# Patient Record
Sex: FEMALE | Race: WHITE | NOT HISPANIC OR LATINO | Employment: OTHER | URBAN - METROPOLITAN AREA
[De-identification: names, ages, dates, MRNs, and addresses within clinical notes are randomized per-mention and may not be internally consistent; named-entity substitution may affect disease eponyms.]

---

## 2018-10-26 ENCOUNTER — HOSPITAL ENCOUNTER (OUTPATIENT)
Facility: HOSPITAL | Age: 68
Discharge: HOME | End: 2018-10-28
Attending: EMERGENCY MEDICINE | Admitting: HOSPITALIST
Payer: COMMERCIAL

## 2018-10-26 DIAGNOSIS — R07.9 CHEST PAIN: ICD-10-CM

## 2018-10-26 DIAGNOSIS — I20.89 ANGINA AT REST: Primary | ICD-10-CM

## 2018-10-26 PROBLEM — E87.6 HYPOKALEMIA: Status: ACTIVE | Noted: 2018-10-26

## 2018-10-26 PROBLEM — I15.2 HYPERTENSION ASSOCIATED WITH DIABETES: Status: ACTIVE | Noted: 2018-10-26

## 2018-10-26 PROBLEM — E11.9 CONTROLLED TYPE 2 DIABETES MELLITUS WITHOUT COMPLICATION, WITHOUT LONG-TERM CURRENT USE OF INSULIN: Status: ACTIVE | Noted: 2018-10-26

## 2018-10-26 PROBLEM — E11.59 HYPERTENSION ASSOCIATED WITH DIABETES: Status: ACTIVE | Noted: 2018-10-26

## 2018-10-26 LAB
ALBUMIN SERPL BCP-MCNC: 3.9 G/DL
ALP SERPL-CCNC: 63 U/L
ALT SERPL W/O P-5'-P-CCNC: 19 U/L
ANION GAP SERPL CALC-SCNC: 7 MMOL/L
AST SERPL-CCNC: 22 U/L
BASOPHILS # BLD AUTO: 0 K/UL
BASOPHILS NFR BLD: 0.6 %
BILIRUB SERPL-MCNC: 0.3 MG/DL
BNP SERPL-MCNC: <10 PG/ML
BUN SERPL-MCNC: 14 MG/DL
CALCIUM SERPL-MCNC: 9.4 MG/DL
CHLORIDE SERPL-SCNC: 99 MMOL/L
CO2 SERPL-SCNC: 30 MMOL/L
CREAT SERPL-MCNC: 0.7 MG/DL
D DIMER PPP IA.FEU-MCNC: 10 MG/L FEU
DIFFERENTIAL METHOD: ABNORMAL
EOSINOPHIL # BLD AUTO: 0.3 K/UL
EOSINOPHIL NFR BLD: 4.7 %
ERYTHROCYTE [DISTWIDTH] IN BLOOD BY AUTOMATED COUNT: 13.7 %
EST. GFR  (AFRICAN AMERICAN): >60 ML/MIN/1.73 M^2
EST. GFR  (NON AFRICAN AMERICAN): >60 ML/MIN/1.73 M^2
GLUCOSE SERPL-MCNC: 134 MG/DL
HCT VFR BLD AUTO: 40.7 %
HGB BLD-MCNC: 13.7 G/DL
LYMPHOCYTES # BLD AUTO: 2.3 K/UL
LYMPHOCYTES NFR BLD: 40.3 %
MCH RBC QN AUTO: 29.7 PG
MCHC RBC AUTO-ENTMCNC: 33.7 G/DL
MCV RBC AUTO: 88 FL
MONOCYTES # BLD AUTO: 0.4 K/UL
MONOCYTES NFR BLD: 7.2 %
NEUTROPHILS # BLD AUTO: 2.7 K/UL
NEUTROPHILS NFR BLD: 47.2 %
PLATELET # BLD AUTO: 224 K/UL
PMV BLD AUTO: 8.3 FL
POCT GLUCOSE: 177 MG/DL (ref 70–110)
POTASSIUM SERPL-SCNC: 3.4 MMOL/L
PROT SERPL-MCNC: 8.2 G/DL
RBC # BLD AUTO: 4.61 M/UL
SODIUM SERPL-SCNC: 136 MMOL/L
TROPONIN I SERPL DL<=0.01 NG/ML-MCNC: <0.006 NG/ML
WBC # BLD AUTO: 5.8 K/UL

## 2018-10-26 PROCEDURE — G0378 HOSPITAL OBSERVATION PER HR: HCPCS

## 2018-10-26 PROCEDURE — 94760 N-INVAS EAR/PLS OXIMETRY 1: CPT

## 2018-10-26 PROCEDURE — 93005 ELECTROCARDIOGRAM TRACING: CPT

## 2018-10-26 PROCEDURE — 25000003 PHARM REV CODE 250: Performed by: EMERGENCY MEDICINE

## 2018-10-26 PROCEDURE — 25000003 PHARM REV CODE 250: Performed by: NURSE PRACTITIONER

## 2018-10-26 PROCEDURE — 85025 COMPLETE CBC W/AUTO DIFF WBC: CPT

## 2018-10-26 PROCEDURE — 83880 ASSAY OF NATRIURETIC PEPTIDE: CPT

## 2018-10-26 PROCEDURE — 85379 FIBRIN DEGRADATION QUANT: CPT

## 2018-10-26 PROCEDURE — 84484 ASSAY OF TROPONIN QUANT: CPT | Mod: 91

## 2018-10-26 PROCEDURE — 63600175 PHARM REV CODE 636 W HCPCS: Performed by: EMERGENCY MEDICINE

## 2018-10-26 PROCEDURE — 93010 ELECTROCARDIOGRAM REPORT: CPT | Mod: ,,, | Performed by: INTERNAL MEDICINE

## 2018-10-26 PROCEDURE — 36415 COLL VENOUS BLD VENIPUNCTURE: CPT

## 2018-10-26 PROCEDURE — 25500020 PHARM REV CODE 255

## 2018-10-26 PROCEDURE — 80053 COMPREHEN METABOLIC PANEL: CPT

## 2018-10-26 PROCEDURE — 99285 EMERGENCY DEPT VISIT HI MDM: CPT | Mod: 25

## 2018-10-26 RX ORDER — SODIUM CHLORIDE 9 MG/ML
INJECTION, SOLUTION INTRAVENOUS
Status: DISPENSED
Start: 2018-10-26 | End: 2018-10-27

## 2018-10-26 RX ORDER — ONDANSETRON 2 MG/ML
4 INJECTION INTRAMUSCULAR; INTRAVENOUS EVERY 8 HOURS PRN
Status: DISCONTINUED | OUTPATIENT
Start: 2018-10-26 | End: 2018-10-28 | Stop reason: HOSPADM

## 2018-10-26 RX ORDER — IBUPROFEN 200 MG
24 TABLET ORAL
Status: DISCONTINUED | OUTPATIENT
Start: 2018-10-26 | End: 2018-10-28 | Stop reason: HOSPADM

## 2018-10-26 RX ORDER — POTASSIUM CHLORIDE 750 MG/1
40 TABLET, EXTENDED RELEASE ORAL
Status: DISCONTINUED | OUTPATIENT
Start: 2018-10-26 | End: 2018-10-28 | Stop reason: HOSPADM

## 2018-10-26 RX ORDER — LEVOCETIRIZINE DIHYDROCHLORIDE 5 MG/1
5 TABLET, FILM COATED ORAL NIGHTLY
COMMUNITY

## 2018-10-26 RX ORDER — ZOLPIDEM TARTRATE 5 MG/1
5 TABLET ORAL NIGHTLY PRN
Status: DISCONTINUED | OUTPATIENT
Start: 2018-10-26 | End: 2018-10-28 | Stop reason: HOSPADM

## 2018-10-26 RX ORDER — HYDROXYZINE HYDROCHLORIDE 25 MG/1
25 TABLET, FILM COATED ORAL 3 TIMES DAILY PRN
Status: DISCONTINUED | OUTPATIENT
Start: 2018-10-26 | End: 2018-10-28 | Stop reason: HOSPADM

## 2018-10-26 RX ORDER — IBUPROFEN 200 MG
16 TABLET ORAL
Status: DISCONTINUED | OUTPATIENT
Start: 2018-10-26 | End: 2018-10-28 | Stop reason: HOSPADM

## 2018-10-26 RX ORDER — GLIMEPIRIDE 2 MG/1
4 TABLET ORAL
Status: DISCONTINUED | OUTPATIENT
Start: 2018-10-27 | End: 2018-10-27

## 2018-10-26 RX ORDER — POTASSIUM CHLORIDE 750 MG/1
60 TABLET, EXTENDED RELEASE ORAL
Status: DISCONTINUED | OUTPATIENT
Start: 2018-10-26 | End: 2018-10-28 | Stop reason: HOSPADM

## 2018-10-26 RX ORDER — GLUCAGON 1 MG
1 KIT INJECTION
Status: DISCONTINUED | OUTPATIENT
Start: 2018-10-26 | End: 2018-10-28 | Stop reason: HOSPADM

## 2018-10-26 RX ORDER — ACETAMINOPHEN 325 MG/1
650 TABLET ORAL EVERY 6 HOURS PRN
Status: DISCONTINUED | OUTPATIENT
Start: 2018-10-26 | End: 2018-10-28 | Stop reason: HOSPADM

## 2018-10-26 RX ORDER — BENAZEPRIL HYDROCHLORIDE 10 MG/1
10 TABLET ORAL DAILY
Status: DISCONTINUED | OUTPATIENT
Start: 2018-10-27 | End: 2018-10-28 | Stop reason: HOSPADM

## 2018-10-26 RX ORDER — INSULIN ASPART 100 [IU]/ML
1-10 INJECTION, SOLUTION INTRAVENOUS; SUBCUTANEOUS
Status: DISCONTINUED | OUTPATIENT
Start: 2018-10-26 | End: 2018-10-28 | Stop reason: HOSPADM

## 2018-10-26 RX ORDER — AMOXICILLIN 250 MG
1 CAPSULE ORAL DAILY PRN
Status: DISCONTINUED | OUTPATIENT
Start: 2018-10-26 | End: 2018-10-28 | Stop reason: HOSPADM

## 2018-10-26 RX ORDER — ASPIRIN 325 MG
325 TABLET ORAL
Status: COMPLETED | OUTPATIENT
Start: 2018-10-26 | End: 2018-10-26

## 2018-10-26 RX ADMIN — ASPIRIN 325 MG ORAL TABLET 325 MG: 325 PILL ORAL at 04:10

## 2018-10-26 RX ADMIN — INSULIN ASPART 1 UNITS: 100 INJECTION, SOLUTION INTRAVENOUS; SUBCUTANEOUS at 09:10

## 2018-10-26 RX ADMIN — POTASSIUM CHLORIDE 40 MEQ: 750 TABLET, EXTENDED RELEASE ORAL at 09:10

## 2018-10-26 RX ADMIN — POTASSIUM CHLORIDE 40 MEQ: 750 TABLET, EXTENDED RELEASE ORAL at 11:10

## 2018-10-26 RX ADMIN — IOHEXOL 75 ML: 350 INJECTION, SOLUTION INTRAVENOUS at 05:10

## 2018-10-26 RX ADMIN — ZOLPIDEM TARTRATE 5 MG: 5 TABLET ORAL at 11:10

## 2018-10-26 NOTE — ED NOTES
Assumed care from Yovani Knowles RN. Patient awake, alert and oriented x 3, skin warm and dry, in NAD with family at bedside.

## 2018-10-26 NOTE — ED PROVIDER NOTES
Encounter Date: 10/26/2018    SCRIBE #1 NOTE: I, Addy Saeed, am scribing for, and in the presence of, Dr. Briggs .       History     Chief Complaint   Patient presents with    Shortness of Breath     with occas CP off and on x 1 week.      Time seen by provider: 3:30 PM on 10/26/2018      Martina Osullivan is a 68 y.o. female with a PMHx of HTN and DM who presents to the ED for further evaluation SOB that started 1 week ago.  services were offered, but the patient denied. She states that she has had a cough with this SOB. She states that the SOB is abrupt and sporadic throughout the week. She states that she has had chest heaviness with this SOB as well. The patient denies drinking and smoking. She also denies fever and chills currently. The patient has no pertinent surgical history.       The history is provided by the patient.     Review of patient's allergies indicates:  No Known Allergies  Past Medical History:   Diagnosis Date    Diabetes mellitus     Hypertension      History reviewed. No pertinent surgical history.  History reviewed. No pertinent family history.  Social History     Tobacco Use    Smoking status: Never Smoker   Substance Use Topics    Alcohol use: No    Drug use: Not on file     Review of Systems   Constitutional: Negative for chills and fever.   Respiratory: Positive for shortness of breath.    Cardiovascular: Positive for chest pain.   All other systems reviewed and are negative.      Physical Exam     Initial Vitals [10/26/18 1518]   BP Pulse Resp Temp SpO2   (!) 141/66 99 14 98.9 °F (37.2 °C) 95 %      MAP       --         Physical Exam    Nursing note and vitals reviewed.  Constitutional: She appears well-developed and well-nourished. She is not diaphoretic. No distress.   HENT:   Head: Normocephalic and atraumatic.   Eyes: EOM are normal.   Neck: Normal range of motion. Neck supple.   Cardiovascular: Normal rate, regular rhythm and normal heart sounds. Exam reveals no  gallop and no friction rub.    No murmur heard.  Pulmonary/Chest: Breath sounds normal. No respiratory distress. She has no wheezes. She has no rhonchi. She has no rales.   Abdominal: She exhibits no distension. There is no tenderness. There is no rebound.   Musculoskeletal: Normal range of motion.   Neurological: She is alert and oriented to person, place, and time.   Skin: Skin is warm and dry.   Psychiatric: She has a normal mood and affect. Her behavior is normal. Judgment and thought content normal.         ED Course   Procedures  Labs Reviewed - No data to display       Imaging Results    None          Medical Decision Making:   History:   Old Medical Records: I decided to obtain old medical records.  Clinical Tests:   Lab Tests: Ordered and Reviewed  Radiological Study: Ordered and Reviewed            Scribe Attestation:   Scribe #1: I performed the above scribed service and the documentation accurately describes the services I performed. I attest to the accuracy of the note.    I, Dr. Briggs, personally performed the services described in this documentation. All medical record entries made by the scribe were at my direction and in my presence.  I have reviewed the chart and agree that the record reflects my personal performance and is accurate and complete.7:19 PM 10/26/2018            ED Course as of Oct 26 1919   Fri Oct 26, 2018   1528 BP: (!) 141/66 [EF]   1528 Temp: 98.9 °F (37.2 °C) [EF]   1528 Temp src: Oral [EF]   1528 Pulse: 99 [EF]   1528 Resp: 14 [EF]   1528 SpO2: 95 % [EF]   1603 X-Ray Chest PA And Lateral [EF]   1620 X-Ray Chest PA And Lateral [EF]   1714 D-Dimer: (!) 10.00 [EF]   1721 Troponin I: <0.006 [EF]   1818 CTA Chest Non-Coronary (PE Study) [EF]   1907 Zoe kirkland to admit  [EF]      ED Course User Index  [EF] Kyaw Briggs MD     Clinical Impression:   The encounter diagnosis was Chest pain.            Very pleasant 68-year-old female with diabetes and hypertension presents with  a week of intermittent chest discomfort and shortness of breath. She indicates the sternal region as the location of her chest pressure states that she has associated shortness of breath with this.  No diaphoresis no ripping or tearing pain.  D-dimer significantly elevated but a CTA does not demonstrate a pulmonary embolism.  EKG unremarkable for any acute findings.  Patient placed in the observation unit for serial enzymes.  Admitted to Hospital Medicine                 Kyaw Briggs MD  10/26/18 1920

## 2018-10-27 PROBLEM — F41.9 ANXIETY: Status: ACTIVE | Noted: 2018-10-27

## 2018-10-27 PROBLEM — I20.89 ANGINA AT REST: Status: ACTIVE | Noted: 2018-10-26

## 2018-10-27 LAB
CHOLEST SERPL-MCNC: 179 MG/DL
CHOLEST/HDLC SERPL: 4.3 {RATIO}
HDLC SERPL-MCNC: 42 MG/DL
HDLC SERPL: 23.5 %
LDLC SERPL CALC-MCNC: 95.8 MG/DL
NONHDLC SERPL-MCNC: 137 MG/DL
POCT GLUCOSE: 112 MG/DL (ref 70–110)
POCT GLUCOSE: 157 MG/DL (ref 70–110)
POCT GLUCOSE: 174 MG/DL (ref 70–110)
POCT GLUCOSE: 174 MG/DL (ref 70–110)
POTASSIUM SERPL-SCNC: 4.5 MMOL/L
TRIGL SERPL-MCNC: 206 MG/DL
TROPONIN I SERPL DL<=0.01 NG/ML-MCNC: <0.006 NG/ML

## 2018-10-27 PROCEDURE — 25000003 PHARM REV CODE 250: Performed by: NURSE PRACTITIONER

## 2018-10-27 PROCEDURE — 84484 ASSAY OF TROPONIN QUANT: CPT

## 2018-10-27 PROCEDURE — G0378 HOSPITAL OBSERVATION PER HR: HCPCS

## 2018-10-27 PROCEDURE — 25000003 PHARM REV CODE 250: Performed by: EMERGENCY MEDICINE

## 2018-10-27 PROCEDURE — 96372 THER/PROPH/DIAG INJ SC/IM: CPT

## 2018-10-27 PROCEDURE — 80061 LIPID PANEL: CPT

## 2018-10-27 PROCEDURE — 84132 ASSAY OF SERUM POTASSIUM: CPT

## 2018-10-27 PROCEDURE — 94761 N-INVAS EAR/PLS OXIMETRY MLT: CPT

## 2018-10-27 PROCEDURE — 36415 COLL VENOUS BLD VENIPUNCTURE: CPT

## 2018-10-27 PROCEDURE — 82962 GLUCOSE BLOOD TEST: CPT

## 2018-10-27 RX ADMIN — INSULIN ASPART 1 UNITS: 100 INJECTION, SOLUTION INTRAVENOUS; SUBCUTANEOUS at 08:10

## 2018-10-27 RX ADMIN — INSULIN ASPART 2 UNITS: 100 INJECTION, SOLUTION INTRAVENOUS; SUBCUTANEOUS at 12:10

## 2018-10-27 RX ADMIN — HYDROXYZINE HYDROCHLORIDE 25 MG: 25 TABLET, FILM COATED ORAL at 08:10

## 2018-10-27 RX ADMIN — ZOLPIDEM TARTRATE 5 MG: 5 TABLET ORAL at 11:10

## 2018-10-27 RX ADMIN — BENAZEPRIL HYDROCHLORIDE 10 MG: 10 TABLET, COATED ORAL at 10:10

## 2018-10-27 RX ADMIN — INSULIN ASPART 2 UNITS: 100 INJECTION, SOLUTION INTRAVENOUS; SUBCUTANEOUS at 05:10

## 2018-10-27 NOTE — PLAN OF CARE
Problem: Diabetes, Type 2 (Adult)  Intervention: Support/Optimize Psychosocial Response to Condition  Encouraged to use accucheck machine at home to monitor blood sugar, BS this AM is 112, remains NPO as ordered.No complaint at this time.

## 2018-10-27 NOTE — HPI
Ms. Osullivan is a 67yo F with aPMH of DM2 and HTN. She presents to the ED with c/o SOB. It started 1 week ago and is associated with a cough and chest chest heaviness. While in the ED, her EKG, CXR, and troponin were normal. She was given ASA 325mg x1. Her D-dimer was 10 and a CTA chest was done and was negative for a PE. She currently c/o anxiety and thinks that is the cause for her pain. She currently denies chest pain.  .

## 2018-10-27 NOTE — PROGRESS NOTES
"Progress Note  [facility]    Admit Date: 10/26/2018    SUBJECTIVE:     Follow-up For:  Angina at rest      Interval history (See H&P for complete P,F,SHx) : Ms. Osullivan is a 69yo F with aPMH of DM2 and HTN. She presents to the ED with c/o SOB. It started 1 week ago and is associated with a cough and chest chest heaviness. While in the ED, her EKG, CXR, and troponin were normal. She was given ASA 325mg x1. Her D-dimer was 10 and a CTA chest was done and was negative for a PE. She currently c/o anxiety and thinks that is the cause for her pain. She currently denies chest pain.    Now chest pain free. Wanting to go home, explained to pt the need for further risk stratification with stress test due to DM and HTN. Agreeable to stay until AM.     Review of Systems: Systems were reviewed and otherwise negative unless indicated below.  Pain scale: 0  Gen- No Weakness/ Fatigued  Neuro- No Confusion  CV- No Chest Pain/ Palpitations  Resp: No Cough/ SOB  GI- No Nausea/Vomiting  Extrem-No Pain/Swelling        OBJECTIVE:     Vital Signs Range (Last 24H):  Temp:  [96.3 °F (35.7 °C)-98 °F (36.7 °C)]   Pulse:  []   Resp:  [18-22]   BP: (105-154)/(73-87)   SpO2:  [94 %-100 %]     I & O (Last 24H):    Intake/Output Summary (Last 24 hours) at 10/27/2018 1815  Last data filed at 10/27/2018 0600  Gross per 24 hour   Intake 480 ml   Output 300 ml   Net 180 ml       Estimated body mass index is 19.93 kg/m² as calculated from the following:    Height as of this encounter: 4' 11" (1.499 m).    Weight as of this encounter: 44.8 kg (98 lb 11.2 oz).    Physical Exam:  General- Patient alert and oriented x3 in NAD  HEENT- PERRLA, EOMI, OP clear, MMM  Neck- No JVD, Lymphadenopathy, Thyromegaly  CV- Regular rate and rhythm, No Murmur/bina/rubs  Resp- Lungs CTA Bilaterally, No increased WOB  GI- Non tender/non-distended, BS normoactive x4 quads, no HSM  Extrem- No cyanosis, clubbing, edema. Pulses 2+ and " symmetric  Neuro- Strength 5/5 flexors/extensors, DTRs 2+ and symmetric, Intact sensation to light touch grossly  Skin-  No rashes, masses or lesions noted    Laboratory/Diagnostic Data:  Reviewed and noted in plan where applicable- Please see chart for full lab data.    Medications:  Medication list was reviewed and changes noted under Assessment/Plan.    ASSESSMENT/PLAN:     Active Problems:    Active Hospital Problems    Diagnosis  POA    *Angina at rest [I20.8]  Chest pain has resolved, NM stress test in AM  NPO after midnight  Yes    Anxiety [F41.9]  Much improved  Yes    Hypertension associated with diabetes [E11.59, I10]  Currently with  Better control  Continue current medications  Monitor and treat accordingly   Yes    Controlled type 2 diabetes mellitus without complication, without long-term current use of insulin [E11.9]  Hold po DM agent  ISS  Yes    Hypokalemia [E87.6]  Replete and recheck   Yes      Resolved Hospital Problems   No resolved problems to display.         Disposition-     VTE Risk Mitigation (From admission, onward)        Ordered     IP VTE LOW RISK PATIENT  Once      10/26/18 2017

## 2018-10-27 NOTE — PLAN OF CARE
SSC met with patient and spouse Sohail Osullivan at bedside to complete discharge planning assessment.  Patient verified all demographic information on facesheet is correct.  Patient verified PCP is VAL Espinosa.  SSC corrected in patient information.  Patient verified primary health insurance is medicare and secondary is [place] medicaid.  Patient states she has not been admitted within the last 30 days.  Patient is independent and lives with spouse. Spouse drives for patient .  Patient with no home health or DME.  Patient with no POA or living will.  Patient uses [facility] [place], [place] [phone] .  Patient is not dialysis nor on medication coumadin.  Patient has no financial issues at this time.  Patient diabetic.  Patient requesting glucometer and supplies.                Discharge Planning Assessment    Patient Identification  Name: Martina Osullivan  Age: 68 y.o.   Gender: female.  Admitting Diagnosis: Chest pain  PCP: Primary Doctor No   PCP Address: None  PCP Phone Number: None     Telephone Contacts  Extended Emergency Contact Information  Primary Emergency Contact: Sohail Osullivan   [place]  Mobile Phone: [phone]  Relation: Spouse    Alert/Orientation: yes xs 4  If patient is unable to sign for self, who is handling affairs?: spouse  Is this person the HPOA? no  Does patient have a living will? no    Arrived from: home  Lives with: spouse  Support System: family  Who do you want involved in your discharge planning? spouse  Are they available to help you at discharge? yes  Prior Level of Functioning: independent  Were you able to care for yourself at home? yes  Who assists with Medications, Meal Prep, Housekeeping, Laundry, Shopping, MD Appts?: spouse    Have you been in the hospital in the last 30 days?: no  If so, were you admitted for the same reason? n/a  If not, why were you in the hospital? n/a    Services received prior to admit:  None at this time  DME used at home: none  at this time    Capacity for self-care: independent  Services patient will need at discharge: none  DME pt will need at discharge: glucometer with supplies    Pharmacy most often used: [facility]  Able to afford meds?: yes    How do you get to your MD appointments / Do you have transportation or depend on someone else:  spouse  Are you taking Coumadin at home?:   no If so, who monitors your INR: n/a  Are you on Dialysis?:  no If so where do you attend dialysis: n/a    Community Resources & Referrals Needed: none at this time      Discharge plan 1:  Home with family  Discharge plan 2:

## 2018-10-27 NOTE — H&P
[facility] Hospital Medicine  History & Physical    Patient Name: Martina Osullivan  MRN: [id number]  Admission Date: 10/26/2018  Attending Physician: Damien Saha MD   Primary Care Provider: Primary Doctor No         Patient information was obtained from patient and ER records.     Subjective:     Principal Problem:Chest pain    Chief Complaint:   Chief Complaint   Patient presents with    Shortness of Breath     with occas CP off and on x 1 week.         HPI: Ms. Osullivan is a 67yo F with aPMH of DM2 and HTN. She presents to the ED with c/o SOB. It started 1 week ago and is associated with a cough and chest chest heaviness. While in the ED, her EKG, CXR, and troponin were normal. She was given ASA 325mg x1. Her D-dimer was 10 and a CTA chest was done and was negative for a PE. She currently c/o anxiety and thinks that is the cause for her pain. She currently denies chest pain.  .          Past Medical History:   Diagnosis Date    Diabetes mellitus     Hypertension        History reviewed. No pertinent surgical history.    Review of patient's allergies indicates:  No Known Allergies    No current facility-administered medications on file prior to encounter.      Current Outpatient Medications on File Prior to Encounter   Medication Sig    benazepril (LOTENSIN) 10 MG tablet Take 1 tablet (10 mg total) by mouth once daily.    glimepiride (AMARYL) 4 MG tablet Take 4 mg by mouth after lunch. Take daily.    levocetirizine (XYZAL) 5 MG tablet Take 5 mg by mouth every evening.     Family History     Mother: -- stomach cancer  Father: : DM        Tobacco Use    Smoking status: Never Smoker   Substance and Sexual Activity    Alcohol use: No    Drug use: Not on file    Sexual activity: Not on file     Review of Systems   Constitutional: Negative for chills and fever.   HENT: Negative for congestion.    Eyes: Negative for visual disturbance.   Respiratory: Positive for cough and  shortness of breath.    Cardiovascular: Positive for chest pain. Negative for palpitations.        Chest Heaviness   Gastrointestinal: Negative for abdominal pain, constipation, diarrhea, nausea and vomiting.   Genitourinary: Negative for dysuria.   Musculoskeletal: Negative for arthralgias.   Skin: Negative for wound.   Neurological: Negative for dizziness, syncope and headaches.   Hematological: Does not bruise/bleed easily.   Psychiatric/Behavioral: Negative for confusion and hallucinations.     Objective:     Vital Signs (Most Recent):  Temp: 98.9 °F (37.2 °C) (10/26/18 1518)  Pulse: 90 (10/26/18 2000)  Resp: 14 (10/26/18 1518)  BP: (!) 154/87 (10/26/18 2000)  SpO2: 99 % (10/26/18 2000) Vital Signs (24h Range):  Temp:  [98.9 °F (37.2 °C)] 98.9 °F (37.2 °C)  Pulse:  [] 90  Resp:  [14] 14  SpO2:  [95 %-100 %] 99 %  BP: (122-154)/(55-87) 154/87     Weight: 49.9 kg (110 lb)  Body mass index is 22.22 kg/m².    Physical Exam   Constitutional: She is oriented to person, place, and time. No distress.   HENT:   Head: Normocephalic.   Eyes: Pupils are equal, round, and reactive to light.   Neck: Normal range of motion. Neck supple. No JVD present. No tracheal deviation present.   Cardiovascular: Regular rhythm, normal heart sounds and intact distal pulses. Tachycardia present.   No murmur heard.  ST low 100's on telemetry   Pulmonary/Chest: Effort normal and breath sounds normal. No respiratory distress.   Abdominal: Soft. Bowel sounds are normal. She exhibits no distension. There is no tenderness.   Musculoskeletal: Normal range of motion. She exhibits no edema.   Neurological: She is alert and oriented to person, place, and time. No cranial nerve deficit.   Skin: Skin is warm, dry and intact. Capillary refill takes less than 2 seconds.   Psychiatric: She has a normal mood and affect. Her behavior is normal. Judgment and thought content normal.         CRANIAL NERVES     CN III, IV, VI   Pupils are equal, round,  and reactive to light.       Significant Labs:   CBC:   Recent Labs   Lab 10/26/18  1609   WBC 5.80   HGB 13.7   HCT 40.7        CMP:   Recent Labs   Lab 10/26/18  1609      K 3.4*   CL 99   CO2 30*   *   BUN 14   CREATININE 0.7   CALCIUM 9.4   PROT 8.2   ALBUMIN 3.9   BILITOT 0.3   ALKPHOS 63   AST 22   ALT 19   ANIONGAP 7*   EGFRNONAA >60     Cardiac Markers:   Recent Labs   Lab 10/26/18  1609   BNP <10     POCT Glucose:   Recent Labs   Lab 10/26/18  2106   POCTGLUCOSE 177*     Troponin:   Recent Labs   Lab 10/26/18  1609 10/26/18  1849   TROPONINI <0.006 <0.006     Lab Results   Component Value Date    DDIMER 10.00 (H) 10/26/2018         Significant Imaging:     CXR: Reviewed film, looks ok. Reviewed radiologist's report--   Impression     Atherosclerosis otherwise negative chest.         Chest CTA PE protocol: Reviewed radiologist's report--    Impression     Bibasilar dependent atelectasis, mild.  Prior granulomatous disease.  Otherwise negative CTA chest.         Assessment/Plan:     * Chest pain    Monitor on telemetry  Given  in ED  Trend troponin  Obtain fasting lipid  ASA and statin daily       Anxiety    Atarax prn       Hypokalemia    Replace K as needed per sliding scale  Monitor labs       Controlled type 2 diabetes mellitus without complication, without long-term current use of insulin    Continue home DM regimen  Monitor blood sugars QAC&HS  SSi prn  Diabetic diet       Hypertension associated with diabetes    Chronic/Controlled. Continue chronic medications, adjusting as needed.         VTE Risk Mitigation (From admission, onward)        Ordered     IP VTE LOW RISK PATIENT  Once      10/26/18 2017             Zoe Byrd NP  Department of Hospital Medicine   [facility]

## 2018-10-27 NOTE — SUBJECTIVE & OBJECTIVE
Past Medical History:   Diagnosis Date    Diabetes mellitus     Hypertension        History reviewed. No pertinent surgical history.    Review of patient's allergies indicates:  No Known Allergies    No current facility-administered medications on file prior to encounter.      Current Outpatient Medications on File Prior to Encounter   Medication Sig    benazepril (LOTENSIN) 10 MG tablet Take 1 tablet (10 mg total) by mouth once daily.    glimepiride (AMARYL) 4 MG tablet Take 4 mg by mouth after lunch. Take daily.    levocetirizine (XYZAL) 5 MG tablet Take 5 mg by mouth every evening.     Family History     None        Tobacco Use    Smoking status: Never Smoker   Substance and Sexual Activity    Alcohol use: No    Drug use: Not on file    Sexual activity: Not on file     Review of Systems   Constitutional: Negative for chills and fever.   HENT: Negative for congestion.    Eyes: Negative for visual disturbance.   Respiratory: Positive for cough and shortness of breath.    Cardiovascular: Positive for chest pain. Negative for palpitations.        Chest Heaviness   Gastrointestinal: Negative for abdominal pain, constipation, diarrhea, nausea and vomiting.   Genitourinary: Negative for dysuria.   Musculoskeletal: Negative for arthralgias.   Skin: Negative for wound.   Neurological: Negative for dizziness, syncope and headaches.   Hematological: Does not bruise/bleed easily.   Psychiatric/Behavioral: Negative for confusion and hallucinations.     Objective:     Vital Signs (Most Recent):  Temp: 98.9 °F (37.2 °C) (10/26/18 1518)  Pulse: 90 (10/26/18 2000)  Resp: 14 (10/26/18 1518)  BP: (!) 154/87 (10/26/18 2000)  SpO2: 99 % (10/26/18 2000) Vital Signs (24h Range):  Temp:  [98.9 °F (37.2 °C)] 98.9 °F (37.2 °C)  Pulse:  [] 90  Resp:  [14] 14  SpO2:  [95 %-100 %] 99 %  BP: (122-154)/(55-87) 154/87     Weight: 49.9 kg (110 lb)  Body mass index is 22.22 kg/m².    Physical Exam   Constitutional: She is oriented  to person, place, and time. No distress.   HENT:   Head: Normocephalic.   Eyes: Pupils are equal, round, and reactive to light.   Neck: Normal range of motion. Neck supple. No JVD present. No tracheal deviation present.   Cardiovascular: Regular rhythm, normal heart sounds and intact distal pulses. Tachycardia present.   No murmur heard.  ST low 100's on telemetry   Pulmonary/Chest: Effort normal and breath sounds normal. No respiratory distress.   Abdominal: Soft. Bowel sounds are normal. She exhibits no distension. There is no tenderness.   Musculoskeletal: Normal range of motion. She exhibits no edema.   Neurological: She is alert and oriented to person, place, and time. No cranial nerve deficit.   Skin: Skin is warm, dry and intact. Capillary refill takes less than 2 seconds.   Psychiatric: She has a normal mood and affect. Her behavior is normal. Judgment and thought content normal.         CRANIAL NERVES     CN III, IV, VI   Pupils are equal, round, and reactive to light.       Significant Labs:   CBC:   Recent Labs   Lab 10/26/18  1609   WBC 5.80   HGB 13.7   HCT 40.7        CMP:   Recent Labs   Lab 10/26/18  1609      K 3.4*   CL 99   CO2 30*   *   BUN 14   CREATININE 0.7   CALCIUM 9.4   PROT 8.2   ALBUMIN 3.9   BILITOT 0.3   ALKPHOS 63   AST 22   ALT 19   ANIONGAP 7*   EGFRNONAA >60     Cardiac Markers:   Recent Labs   Lab 10/26/18  1609   BNP <10     POCT Glucose:   Recent Labs   Lab 10/26/18  2106   POCTGLUCOSE 177*     Troponin:   Recent Labs   Lab 10/26/18  1609 10/26/18  1849   TROPONINI <0.006 <0.006     Lab Results   Component Value Date    DDIMER 10.00 (H) 10/26/2018         Significant Imaging:     CXR: Reviewed film, looks ok. Reviewed radiologist's report--   Impression     Atherosclerosis otherwise negative chest.         Chest CTA PE protocol: Reviewed radiologist's report--    Impression     Bibasilar dependent atelectasis, mild.  Prior granulomatous disease.  Otherwise  negative CTA chest.

## 2018-10-27 NOTE — NURSING
Admitted from ER with dx of chest pain, SOB x 1 week, currently denies chest pain although she claims she's nervous.Oriented to call light, POC.

## 2018-10-27 NOTE — PLAN OF CARE
Problem: Patient Care Overview  Goal: Plan of Care Review  Outcome: Ongoing (interventions implemented as appropriate)  Pt on room air with 99% sats.

## 2018-10-27 NOTE — PLAN OF CARE
Problem: Patient Care Overview  Goal: Plan of Care Review  Outcome: Ongoing (interventions implemented as appropriate)  Pt remained free from injury/falls this shift. VSS- no signs of any distress. Tele NSR. POC reviewed with pt and spouse. Pt repositioned independently, skin intact. Blood glucose monitored and treated per order set. Pt denied any pain this shift. Pt to have stress test tomorrow AM-NPO @midnight.  Bed locked in lowest position, SR upx2, call light within reach. Will continue to monitor.

## 2018-10-28 VITALS
HEIGHT: 59 IN | HEART RATE: 95 BPM | SYSTOLIC BLOOD PRESSURE: 125 MMHG | WEIGHT: 44.06 LBS | RESPIRATION RATE: 18 BRPM | TEMPERATURE: 97 F | BODY MASS INDEX: 8.88 KG/M2 | OXYGEN SATURATION: 96 % | DIASTOLIC BLOOD PRESSURE: 79 MMHG

## 2018-10-28 LAB
POCT GLUCOSE: 123 MG/DL (ref 70–110)
POCT GLUCOSE: 183 MG/DL (ref 70–110)

## 2018-10-28 PROCEDURE — 25000003 PHARM REV CODE 250: Performed by: EMERGENCY MEDICINE

## 2018-10-28 PROCEDURE — 63600175 PHARM REV CODE 636 W HCPCS

## 2018-10-28 PROCEDURE — 82962 GLUCOSE BLOOD TEST: CPT

## 2018-10-28 PROCEDURE — G0378 HOSPITAL OBSERVATION PER HR: HCPCS

## 2018-10-28 RX ORDER — PRAVASTATIN SODIUM 10 MG/1
10 TABLET ORAL DAILY
Qty: 30 TABLET | Refills: 0 | Status: SHIPPED | OUTPATIENT
Start: 2018-10-28 | End: 2020-10-30 | Stop reason: CLARIF

## 2018-10-28 RX ORDER — TRAZODONE HYDROCHLORIDE 50 MG/1
50 TABLET ORAL NIGHTLY
Qty: 30 TABLET | Refills: 0 | Status: SHIPPED | OUTPATIENT
Start: 2018-10-28 | End: 2020-10-31

## 2018-10-28 RX ORDER — REGADENOSON 0.08 MG/ML
0.4 INJECTION, SOLUTION INTRAVENOUS ONCE
Status: COMPLETED | OUTPATIENT
Start: 2018-10-28 | End: 2018-10-28

## 2018-10-28 RX ORDER — REGADENOSON 0.08 MG/ML
INJECTION, SOLUTION INTRAVENOUS
Status: COMPLETED
Start: 2018-10-28 | End: 2018-10-28

## 2018-10-28 RX ADMIN — BENAZEPRIL HYDROCHLORIDE 10 MG: 10 TABLET, COATED ORAL at 10:10

## 2018-10-28 RX ADMIN — REGADENOSON 0.4 MG: 0.08 INJECTION, SOLUTION INTRAVENOUS at 07:10

## 2018-10-28 NOTE — PLAN OF CARE
Problem: Patient Care Overview  Goal: Plan of Care Review  Outcome: Ongoing (interventions implemented as appropriate)  Patient alert and oriented resting in bed. NAD. Denies pain or SOB. VSS. NPO after midnight for stress test.  at bedside throughout shift. Plan of care reviewed with patient. Verbalizes understanding.Call light in reach. Pt free from fall or injury. Will monitor.

## 2018-10-28 NOTE — NURSING
IV access removed, catheter intact, patient tolerated well, gauze and tape applied to site. Telemetry removed and returned to monitor room. Educated patient on discharge instructions regarding new/change/continued/discontinued medications, follow-up appointments, diet, and activity, patient states understanding, no further questions needing answered.

## 2018-10-28 NOTE — HOSPITAL COURSE
Patient monitored during observation stay with telemetry. No arrhythmias noted. Troponin trended and negative. She underwent cardiac stress test which is negative for myocardial ischemia. Patient likely with anxiety reaction.     PE: chest CTA  Heart RRR

## 2018-10-28 NOTE — PROCEDURES
Lexiscan Myoview stress test:    Baseline ECG: Normal sinus rhythm @ 90 bpm, normal axis, no significant ST-T abnormalities in contiguous leads.     Stress ECG: No significant ischemic ST segment changes in contiguous leads.     Patient was given 0.4 mg of Regadenosine and monitored in the stress lab continually for several minutes. A peak heart rate of 141 bpm was achieved and peak blood pressure of 162/81 mm Hg. Test was terminated due to completion of protocol. She reported shortness of breath, chest discomfort and head ache which resolved in few minutes.      Conclusion:  1. No ischemic ST segment changes in contiguous leads with Lexiscan.   2. Nuclear portion of the stress test to be reported separately by the Radiology team.  4. Adequate hemodynamic response with Lexiscan.      Procedure was Lexiscan stress test and not Suture Removal

## 2018-10-28 NOTE — NURSING
"Pt c/o of feeling as if " blood pressure is getting high" " feels up to head". Patient blood pressure taken 159/72 . Patient appears anxious while disgusting stress test as well as ambulating in room. Prn medication given . Will continue to monitor blood pressure.   "

## 2018-10-28 NOTE — DISCHARGE SUMMARY
[facility]  Hospital Medicine  Discharge Summary      Patient Name: Martina Osullivan  MRN: [id number]  Admission Date: 10/26/2018  Hospital Length of Stay: 0 days  Discharge Date and Time: 10/28/2018  2:00 PM  Attending Physician: No att. providers found   Discharging Provider: María Dubon NP  Primary Care Provider: Shaun Espinoza MD      HPI:   Ms. Osullivan is a 67yo F with aPMH of DM2 and HTN. She presents to the ED with c/o SOB. It started 1 week ago and is associated with a cough and chest chest heaviness. While in the ED, her EKG, CXR, and troponin were normal. She was given ASA 325mg x1. Her D-dimer was 10 and a CTA chest was done and was negative for a PE. She currently c/o anxiety and thinks that is the cause for her pain. She currently denies chest pain.  .          * No surgery found *      Hospital Course:   Patient monitored during observation stay with telemetry. No arrhythmias noted. Troponin trended and negative. She underwent cardiac stress test which is negative for myocardial ischemia. Patient likely with anxiety reaction.     PE: chest CTA  Heart RRR     Consults:   Consults (From admission, onward)        Status Ordering Provider     Inpatient consult to Cardiology  Once     Provider:  Abe Lazcano MD    Acknowledged SOFIYA ROUSSEAU          No new Assessment & Plan notes have been filed under this hospital service since the last note was generated.  Service: Hospital Medicine    Final Active Diagnoses:    Diagnosis Date Noted POA    PRINCIPAL PROBLEM:  Angina at rest [I20.8] 10/26/2018 Yes    Anxiety [F41.9] 10/27/2018 Yes    Hypertension associated with diabetes [E11.59, I10] 10/26/2018 Yes    Controlled type 2 diabetes mellitus without complication, without long-term current use of insulin [E11.9] 10/26/2018 Yes    Hypokalemia [E87.6] 10/26/2018 Yes      Problems Resolved During this Admission:       Discharged Condition: stable    Disposition: Home or  Self Care    Follow Up:  Follow-up Information     Shanu Espinoza MD In 1 week.    Specialty:  Family Medicine  Why:  hospital follow up  Contact information:  [address]  [place] [id number] [phone]                 Patient Instructions:      Diet diabetic     Notify your health care provider if you experience any of the following:  persistent dizziness, light-headedness, or visual disturbances     Notify your health care provider if you experience any of the following:  severe persistent headache     Notify your health care provider if you experience any of the following:  persistent nausea and vomiting or diarrhea     Notify your health care provider if you experience any of the following:  difficulty breathing or increased cough     Notify your health care provider if you experience any of the following:  redness, tenderness, or signs of infection (pain, swelling, redness, odor or green/yellow discharge around incision site)     Notify your health care provider if you experience any of the following:  severe uncontrolled pain     Activity as tolerated       Significant Diagnostic Studies: Labs:   BMP:   Recent Labs   Lab 10/27/18  0429   K 4.5    and CMP   Recent Labs   Lab 10/27/18  0429   K 4.5     Cardiac Graphics: Echocardiogram: 2D echo with color flow doppler: No results found for this or any previous visit.   NM Myocardial Perfusion Spect Multi Pharmacologic [[id number]] Resulted: 10/28/18 0917   Order Status: Completed Updated: 10/28/18 0920   Narrative:     EXAMINATION:  NM MYOCARDIAL PERFUSION SPECT MULTI PHARM    CLINICAL HISTORY:  CAD risk, intermediate, asymptomatic;    TECHNIQUE:  Following the intravenous administration of 12.4 mCi and 25.3 mCi of technetium 99m-Myoview respectively, rest and stress SPECT imaging of the left ventricular myocardium was performed.  0.4 mg of Lexiscan was administered intravenously for the stress portion of the  examination.    COMPARISON:  None.    FINDINGS:  Static LV SPECT images:There is no significant motion between the rest and stress portion of the examination.  There is minimal splanchnic activity.  No left ventricular dilatation.  No abnormal activity within the right ventricle.  No scintigraphic evidence of reversible stress induced left ventricular myocardial ischemia.  No fixed perfusion defects to suggest remote left ventricular myocardial injury.    Dynamic LV SPECT images: There is normal concentric left ventricular myocardial contractility present.  No focal akinetic or dyskinetic segments.  The left ventricular ejection fraction is    The left ventricular ejection fraction is 95%.  The end-diastolic volume is 21mL.  The end-systolic volume is 1 mL.   Impression:       No scintigraphic evidence of stress-induced reversible left ventricular myocardial ischemia.  No fixed perfusion abnormalities to suggest a remote left ventricular myocardial injury.      Electronically signed by: Chris Almonte MD  Date: 10/28/2018  Time: 09:17   CTA Chest Non-Coronary (PE Study) [[id number]] Resulted: 10/26/18 1810   Order Status: Completed Updated: 10/26/18 1813   Narrative:     EXAMINATION:  CTA CHEST NON CORONARY    CLINICAL HISTORY:  ddimer 10. chest pain sob;    TECHNIQUE:  Low dose axial images, sagittal and coronal reformations were obtained from the thoracic inlet to the lung bases following the IV administration of 75 mL of Omnipaque 350.  Contrast timing was optimized to evaluate the pulmonary arteries.  MIP images were performed.    COMPARISON:  Chest x-ray of October 26, 2018.    FINDINGS:  There is no CTA evidence of pulmonary embolus.  Opacification of the pulmonary vessels is adequate.The heart and great vessels are of normal size and contour. Enlargement or aneurysm is not seen. There is a calcified lymph node identified in the right hilum and a calcified lymph node at the right hemidiaphragm. No adenopathy or  soft tissue masses are identified.    There is mild dependent atelectasis posteriorly.  A consolidated infiltrate, solid mass or nodule is not seen.  No pneumothorax or pleural effusion is identified.   Impression:       Bibasilar dependent atelectasis, mild.  Prior granulomatous disease.  Otherwise negative CTA chest.      Electronically signed by: Blayne Arora MD  Date: 10/26/2018  Time: 18:10   X-Ray Chest PA And Lateral [[id number]] Resulted: 10/26/18 1559   Order Status: Completed Updated: 10/26/18 1601   Narrative:     EXAMINATION:  XR CHEST PA AND LATERAL    CLINICAL HISTORY:  Chest Pain;    TECHNIQUE:  PA and lateral views of the chest were performed.    COMPARISON:  Prior chest of March 15, 2016    FINDINGS:  calcification is noted in the aorta. The cardiac size is within normal limits. No intrapulmonary mass or infiltrate is seen.   Impression:       Atherosclerosis otherwise negative chest.      Electronically signed by: Blayne Arora MD  Date: 10/26/2018  Time: 15:59           Pending Diagnostic Studies:     None         Medications:  Reconciled Home Medications:      Medication List      START taking these medications    pravastatin 10 MG tablet  Commonly known as:  PRAVACHOL  Take 1 tablet (10 mg total) by mouth once daily.     traZODone 50 MG tablet  Commonly known as:  DESYREL  Take 1 tablet (50 mg total) by mouth every evening.        CONTINUE taking these medications    benazepril 10 MG tablet  Commonly known as:  LOTENSIN  Take 1 tablet (10 mg total) by mouth once daily.     glimepiride 4 MG tablet  Commonly known as:  AMARYL  Take 4 mg by mouth after lunch. Take daily.     levocetirizine 5 MG tablet  Commonly known as:  XYZAL  Take 5 mg by mouth every evening.            Indwelling Lines/Drains at time of discharge:   Lines/Drains/Airways          None          Time spent on the discharge of patient: 30 minutes  Patient was seen and examined on the date of discharge and determined to be  suitable for discharge.         María Dubon NP  Department of Hospital Medicine  [facility]

## 2018-10-29 ENCOUNTER — TELEPHONE (OUTPATIENT)
Dept: MEDSURG UNIT | Facility: HOSPITAL | Age: 68
End: 2018-10-29

## 2019-11-12 ENCOUNTER — HOSPITAL ENCOUNTER (EMERGENCY)
Facility: HOSPITAL | Age: 69
Discharge: HOME | End: 2019-11-12
Attending: EMERGENCY MEDICINE
Payer: MEDICARE

## 2019-11-12 VITALS
WEIGHT: 100 LBS | BODY MASS INDEX: 20.16 KG/M2 | RESPIRATION RATE: 18 BRPM | HEIGHT: 59 IN | SYSTOLIC BLOOD PRESSURE: 154 MMHG | TEMPERATURE: 98 F | HEART RATE: 86 BPM | OXYGEN SATURATION: 98 % | DIASTOLIC BLOOD PRESSURE: 77 MMHG

## 2019-11-12 DIAGNOSIS — R10.13 EPIGASTRIC PAIN: Primary | ICD-10-CM

## 2019-11-12 DIAGNOSIS — K29.70 GASTRITIS, PRESENCE OF BLEEDING UNSPECIFIED, UNSPECIFIED CHRONICITY, UNSPECIFIED GASTRITIS TYPE: ICD-10-CM

## 2019-11-12 LAB
ALBUMIN SERPL BCP-MCNC: 4 G/DL (ref 3.5–5.2)
ALP SERPL-CCNC: 63 U/L (ref 55–135)
ALT SERPL W/O P-5'-P-CCNC: 17 U/L (ref 10–44)
ANION GAP SERPL CALC-SCNC: 8 MMOL/L (ref 8–16)
AST SERPL-CCNC: 23 U/L (ref 10–40)
BASOPHILS # BLD AUTO: 0.03 K/UL (ref 0–0.2)
BASOPHILS NFR BLD: 0.6 % (ref 0–1.9)
BILIRUB SERPL-MCNC: 0.4 MG/DL (ref 0.1–1)
BILIRUB UR QL STRIP: NEGATIVE
BUN SERPL-MCNC: 10 MG/DL (ref 8–23)
CALCIUM SERPL-MCNC: 9.2 MG/DL (ref 8.7–10.5)
CHLORIDE SERPL-SCNC: 103 MMOL/L (ref 95–110)
CLARITY UR: CLEAR
CO2 SERPL-SCNC: 29 MMOL/L (ref 23–29)
COLOR UR: YELLOW
CREAT SERPL-MCNC: 0.8 MG/DL (ref 0.5–1.4)
DIFFERENTIAL METHOD: NORMAL
EOSINOPHIL # BLD AUTO: 0.2 K/UL (ref 0–0.5)
EOSINOPHIL NFR BLD: 3.3 % (ref 0–8)
ERYTHROCYTE [DISTWIDTH] IN BLOOD BY AUTOMATED COUNT: 12.9 % (ref 11.5–14.5)
EST. GFR  (AFRICAN AMERICAN): >60 ML/MIN/1.73 M^2
EST. GFR  (NON AFRICAN AMERICAN): >60 ML/MIN/1.73 M^2
GLUCOSE SERPL-MCNC: 120 MG/DL (ref 70–110)
GLUCOSE UR QL STRIP: NEGATIVE
HCT VFR BLD AUTO: 39.3 % (ref 37–48.5)
HGB BLD-MCNC: 12.8 G/DL (ref 12–16)
HGB UR QL STRIP: NEGATIVE
IMM GRANULOCYTES # BLD AUTO: 0.01 K/UL (ref 0–0.04)
KETONES UR QL STRIP: NEGATIVE
LEUKOCYTE ESTERASE UR QL STRIP: NEGATIVE
LIPASE SERPL-CCNC: 64 U/L (ref 4–60)
LYMPHOCYTES # BLD AUTO: 2.3 K/UL (ref 1–4.8)
LYMPHOCYTES NFR BLD: 45.2 % (ref 18–48)
MCH RBC QN AUTO: 29.7 PG (ref 27–31)
MCHC RBC AUTO-ENTMCNC: 32.6 G/DL (ref 32–36)
MCV RBC AUTO: 91 FL (ref 82–98)
MONOCYTES # BLD AUTO: 0.4 K/UL (ref 0.3–1)
MONOCYTES NFR BLD: 8.3 % (ref 4–15)
NEUTROPHILS # BLD AUTO: 2.2 K/UL (ref 1.8–7.7)
NEUTROPHILS NFR BLD: 42.4 % (ref 38–73)
NITRITE UR QL STRIP: NEGATIVE
NRBC BLD-RTO: 0 /100 WBC
PH UR STRIP: 7 [PH] (ref 5–8)
PLATELET # BLD AUTO: 177 K/UL (ref 150–350)
PMV BLD AUTO: 10.1 FL (ref 9.2–12.9)
POTASSIUM SERPL-SCNC: 3.4 MMOL/L (ref 3.5–5.1)
PROT SERPL-MCNC: 8.5 G/DL (ref 6–8.4)
PROT UR QL STRIP: NEGATIVE
RBC # BLD AUTO: 4.31 M/UL (ref 4–5.4)
SODIUM SERPL-SCNC: 140 MMOL/L (ref 136–145)
SP GR UR STRIP: <=1.005 (ref 1–1.03)
URN SPEC COLLECT METH UR: ABNORMAL
UROBILINOGEN UR STRIP-ACNC: NEGATIVE EU/DL
WBC # BLD AUTO: 5.16 K/UL (ref 3.9–12.7)

## 2019-11-12 PROCEDURE — 99284 EMERGENCY DEPT VISIT MOD MDM: CPT | Mod: 25

## 2019-11-12 PROCEDURE — 83690 ASSAY OF LIPASE: CPT

## 2019-11-12 PROCEDURE — 80053 COMPREHEN METABOLIC PANEL: CPT

## 2019-11-12 PROCEDURE — 36415 COLL VENOUS BLD VENIPUNCTURE: CPT

## 2019-11-12 PROCEDURE — 81003 URINALYSIS AUTO W/O SCOPE: CPT

## 2019-11-12 PROCEDURE — 85025 COMPLETE CBC W/AUTO DIFF WBC: CPT

## 2019-11-12 RX ORDER — PANTOPRAZOLE SODIUM 40 MG/1
40 TABLET, DELAYED RELEASE ORAL 2 TIMES DAILY
Qty: 60 TABLET | Refills: 3 | Status: SHIPPED | OUTPATIENT
Start: 2019-11-12 | End: 2020-11-11

## 2019-11-12 NOTE — ED PROVIDER NOTES
Encounter Date: 11/12/2019    SCRIBE #1 NOTE: I, Martha Linton and juwan scribing for, and in the presence of, Miller Bettencourt III, MD.       History     Chief Complaint   Patient presents with    Abdominal Pain     x 3 weeks      Time seen by provider: 12:25 PM on 11/12/2019    Martina Osullivan is a 69 y.o. female who presents to the ED with an onset of intermittent upper abdominal pain starting 3 weeks ago. The patient reports that she has the pain 2-3 times a day, usually after eating. She takes 20 mg Nexium daily. The patient denies constipation, painful urination, blood in her stool, or any other symptoms at this time. Patient's PMHx includes DM and HTN. No abdominal PSHx. No known drug allergies noted.    The history is provided by the patient and a relative.     Review of patient's allergies indicates:   Allergen Reactions    Beef containing products      Past Medical History:   Diagnosis Date    Diabetes mellitus     Hypertension      History reviewed. No pertinent surgical history.  Family History   Problem Relation Age of Onset    Cancer Mother     Diabetes Father      Social History     Tobacco Use    Smoking status: Never Smoker   Substance Use Topics    Alcohol use: No    Drug use: Not on file     Review of Systems   Constitutional: Negative for activity change, appetite change, chills, fatigue and fever.   Eyes: Negative for visual disturbance.   Respiratory: Negative for apnea and shortness of breath.    Cardiovascular: Negative for chest pain and palpitations.   Gastrointestinal: Positive for abdominal pain. Negative for abdominal distention, blood in stool and constipation.   Genitourinary: Negative for difficulty urinating and dysuria.   Musculoskeletal: Negative for neck pain.   Skin: Negative for pallor and rash.   Neurological: Negative for headaches.   Hematological: Does not bruise/bleed easily.   Psychiatric/Behavioral: Negative for agitation.       Physical Exam     Initial Vitals  [11/12/19 1207]   BP Pulse Resp Temp SpO2   (!) 154/77 86 18 98 °F (36.7 °C) 98 %      MAP       --         Physical Exam    Nursing note and vitals reviewed.  Constitutional: She appears well-developed and well-nourished.   HENT:   Head: Normocephalic and atraumatic.   Eyes: Conjunctivae are normal.   Neck: Normal range of motion. Neck supple.   Cardiovascular: Normal rate, regular rhythm and normal heart sounds. Exam reveals no gallop and no friction rub.    No murmur heard.  Pulmonary/Chest: Effort normal and breath sounds normal. No respiratory distress. She has no wheezes. She has no rhonchi. She has no rales.   Abdominal: Soft. She exhibits no distension. There is no tenderness. There is no CVA tenderness.   RUQ pain, but no abdominal tenderness.   Musculoskeletal: Normal range of motion.   Neurological: She is alert and oriented to person, place, and time.   Skin: Skin is warm and dry. No erythema.   Psychiatric: She has a normal mood and affect.         ED Course   Procedures  Labs Reviewed   COMPREHENSIVE METABOLIC PANEL - Abnormal; Notable for the following components:       Result Value    Potassium 3.4 (*)     Glucose 120 (*)     Total Protein 8.5 (*)     All other components within normal limits   LIPASE - Abnormal; Notable for the following components:    Lipase 64 (*)     All other components within normal limits   URINALYSIS, REFLEX TO URINE CULTURE - Abnormal; Notable for the following components:    Specific Gravity, UA <=1.005 (*)     All other components within normal limits    Narrative:     Preferred Collection Type->Urine, Clean Catch   CBC W/ AUTO DIFFERENTIAL          Imaging Results          US Abdomen Limited (Final result)  Result time 11/12/19 13:26:59    Final result by Dexter Roberts MD (11/12/19 13:26:59)                 Impression:      No acute findings.  Hepatic steatosis is possible.      Electronically signed by: Dexter Roberts  Date:    11/12/2019  Time:    13:26              Narrative:    EXAMINATION:  US ABDOMEN LIMITED    CLINICAL HISTORY:  Epigastric pain;    TECHNIQUE:  Limited ultrasound of the right upper quadrant of the abdomen (including pancreas, liver, gallbladder, common bile duct, and spleen) was performed.    COMPARISON:  None.    FINDINGS:  Liver: Normal in size, measuring 12.9 cm. Hepato renal index measures 1.5, although through transmission is normal.  No focal hepatic lesions.    Gallbladder: No calculi, wall thickening, or pericholecystic fluid.  No sonographic Arana's sign.    Biliary system: The common duct is not dilated, measuring 3 mm.  No intrahepatic ductal dilatation.    Spleen: Normal in size and echotexture, measuring 5.9 cm.    Miscellaneous: No upper abdominal ascites.                                 Medical Decision Making:   History:   Old Medical Records: I decided to obtain old medical records.  Clinical Tests:   Lab Tests: Ordered and Reviewed  Radiological Study: Ordered and Reviewed  ED Management:  69-year-old female presents with intermittent epigastric pain that is worsened with eating.  Symptoms are concerning for biliary disease; however, right upper quadrant ultrasound fails to demonstrate any evidence of same.  Symptoms most likely reflect gastritis/peptic ulcer disease; therefore, she will be treated empirically with Protonix 40 mg twice daily.  Further workup is not conducted in the emergency department due to the absence of pain, absence of leukocytosis or other metabolic abnormalities.  She is referred to Gastroenterology for consideration of endoscopy.       APC / Resident Notes:   I, Dr. Miller Bettencourt III, personally performed the services described in this documentation. All medical record entries made by the scribe were at my direction and in my presence.  I have reviewed the chart and agree that the record reflects my personal performance and is accurate and complete       Scribe Attestation:   Scribe #1: I performed the above  scribed service and the documentation accurately describes the services I performed. I attest to the accuracy of the note.                          Clinical Impression:       ICD-10-CM ICD-9-CM   1. Epigastric pain R10.13 789.06         Disposition:   Disposition: Discharged  Condition: Stable                     Miller Bettencourt III, MD  11/12/19 9230

## 2019-11-12 NOTE — ED NOTES
Pt awake alert reports epigastric pain tender to palpate and intermittent for the past couple weeks, denies chest pain or sob, denies n/v/d family at bedside

## 2019-11-29 ENCOUNTER — HOSPITAL ENCOUNTER (EMERGENCY)
Facility: HOSPITAL | Age: 69
Discharge: HOME | End: 2019-11-29
Attending: EMERGENCY MEDICINE
Payer: MEDICARE

## 2019-11-29 VITALS
WEIGHT: 100 LBS | BODY MASS INDEX: 20.16 KG/M2 | DIASTOLIC BLOOD PRESSURE: 84 MMHG | HEIGHT: 59 IN | OXYGEN SATURATION: 96 % | HEART RATE: 84 BPM | SYSTOLIC BLOOD PRESSURE: 190 MMHG | TEMPERATURE: 98 F | RESPIRATION RATE: 18 BRPM

## 2019-11-29 DIAGNOSIS — S06.0X0A CONCUSSION WITHOUT LOSS OF CONSCIOUSNESS, INITIAL ENCOUNTER: Primary | ICD-10-CM

## 2019-11-29 PROCEDURE — 99284 EMERGENCY DEPT VISIT MOD MDM: CPT | Mod: 25

## 2019-11-29 NOTE — ED PROVIDER NOTES
I have evaluated and performed a medical screening assessment on this patient while awaiting a thorough evaluation and treatment. All of the emergency department beds are occupied at this time. When appropriate, laboratory studies will be ordered from triage. The patient has been advised of this process and care plan. Patient complains of headache and anxiety after a trip and fall last week.  Pt states that she was trying to get into the car and slipped, falling backwards striking her head on the concrete.  Pt denies any LOC.  Pt states headache since that time.  On exam pt is A&Ox3.  VSS.  Pupils ERR.  Strength 5/5 in all extremities.      Orders pending: CT head w/o tashi Alicea NP  11/29/19 5258

## 2019-12-06 NOTE — ED PROVIDER NOTES
Encounter Date: 11/29/2019       History     Chief Complaint   Patient presents with    Headache     posterior head pain s/p slip & fall hitting posterior head on section of car -- denies new injury since that time but lingering H/A     Pt is a 68 yo female with medical history of DM and HTN presenting for headache since a trip and fall last week.  Pt states she was trying to get in the car 1 week ago and slipped falling backwards.  Pt denies LOC.  Pt states she has had a lingering posterior headache since that time.  Pt denies any dizziness, blurred vision or change in activity.  Pt denies trying anything for her headache.      The history is provided by the patient and the spouse.     Review of patient's allergies indicates:   Allergen Reactions    Beef containing products      Past Medical History:   Diagnosis Date    Diabetes mellitus     Hypertension      No past surgical history on file.  Family History   Problem Relation Age of Onset    Cancer Mother     Diabetes Father      Social History     Tobacco Use    Smoking status: Never Smoker   Substance Use Topics    Alcohol use: No    Drug use: Not on file     Review of Systems   Constitutional: Negative for activity change, appetite change and fever.   HENT: Negative for sore throat.    Respiratory: Negative for shortness of breath.    Cardiovascular: Negative for chest pain.   Gastrointestinal: Negative for nausea.   Genitourinary: Negative for dysuria.   Musculoskeletal: Negative for arthralgias, back pain, neck pain and neck stiffness.   Skin: Negative for color change, rash and wound.   Neurological: Positive for headaches ( posterior). Negative for dizziness, tremors, speech difficulty, weakness, light-headedness and numbness.   Hematological: Does not bruise/bleed easily.   All other systems reviewed and are negative.      Physical Exam     Initial Vitals [11/29/19 1556]   BP Pulse Resp Temp SpO2   (!) 190/84 84 18 98.4 °F (36.9 °C) 96 %      MAP        --         Physical Exam    Nursing note and vitals reviewed.  Constitutional: Vital signs are normal. She appears well-developed and well-nourished. She is cooperative. No distress.   HENT:   Head: Normocephalic and atraumatic. Not macrocephalic and not microcephalic. Head is without Duran's sign, without abrasion, without contusion and without laceration. Hair is normal.   Mouth/Throat: Oropharynx is clear and moist.   Eyes: Conjunctivae, EOM and lids are normal. Pupils are equal, round, and reactive to light.   Pupils ERR 3-2mm     Neck: Trachea normal, normal range of motion, full passive range of motion without pain and phonation normal. Neck supple. No spinous process tenderness and no muscular tenderness present. No JVD present.   Cardiovascular: Normal rate, regular rhythm and intact distal pulses.   Pulses:       Radial pulses are 2+ on the right side, and 2+ on the left side.   Pulmonary/Chest: Effort normal and breath sounds normal.   Abdominal: Normal appearance.   Neurological: She is alert and oriented to person, place, and time. She has normal strength. No sensory deficit. She displays a negative Romberg sign. GCS eye subscore is 4. GCS verbal subscore is 5. GCS motor subscore is 6.   Skin: Skin is warm, dry and intact. Capillary refill takes less than 2 seconds. No pallor.         ED Course   Procedures  Labs Reviewed - No data to display       Imaging Results          CT Head Without Contrast (Final result)  Result time 11/29/19 17:53:33    Final result by Blayne Arora Jr., MD (11/29/19 17:53:33)                 Impression:      Negative head CT.      Electronically signed by: Blayne Arora MD  Date:    11/29/2019  Time:    17:53             Narrative:    EXAMINATION:  CT HEAD WITHOUT CONTRAST    CLINICAL HISTORY:  Head trauma, headache;    TECHNIQUE:  Low dose axial images were obtained through the head.  Coronal and sagittal reformations were also performed. Contrast was not  administered.    COMPARISON:  None.    FINDINGS:  No cranial fracture is identified.  Scalp edema or hematoma is not seen.    The basal cisterns are clear and symmetric.  There is no mass effect or midline shift.  The ventricles are of normal size and symmetric.  The gray-white matter differentiation is normal.  Within the brain parenchyma no hyper or hypodense lesions consistent with tumor, edema, CVA or hemorrhage is seen.  No intracranial hemorrhage or hematoma is noted.                                 Medical Decision Making:   Initial Assessment:   Emergent evaluation of a 70 yo female patient presenting to the ER with chief complaint of headache after a slip and fall last week.  On exam pt is A&Ox3.  VSS.  Nonfebrile and nontoxic appearing.  Slight TTP to posterior head.  No real signs noted.  Pupil ERR 3-2mm.  Pt denies use of blood thinners.  No C-spine TTP noted.  Good ROM of neck.  No trapezius TTP.  Strength 5/5 in all extremities.  Pt able to ambulate with no assistance.      Differential Diagnosis:   Differential diagnoses include but are not limited to concussion, trauma, SDH, SAH, contusion, head injury.      Clinical Tests:   Radiological Study: Ordered and Reviewed  ED Management:  I will get imaging, medicate and reassess.      CT head negative for any acute abnormalities.  Pt reassessed after pain medication. Pt states decrease in pain.  Pt and  updated on imaging results.  Concussion precautions discussed with patient and .  Follow up with PCP as needed.  Pt verbalized understanding of this plan of care.  All questions/concerns addressed.      Patient is hemodynamically stable, vital signs are normal. Discharge instructions given. Return to ED precautions discussed. Follow up as directed. Pt verbalized understanding of this plan.  Pt is stable for discharge.                    ED Course as of Dec 05 1856   Fri Nov 29, 2019   1845 CT Head Without Contrast [EF]      ED Course User  Index  [EF] Kyaw Briggs MD                Clinical Impression:       ICD-10-CM ICD-9-CM   1. Concussion without loss of consciousness, initial encounter S06.0X0A 850.0         Disposition:   Disposition: Discharged  Condition: Stable                     Indigo Alicea NP  12/05/19 7898

## 2020-10-30 ENCOUNTER — HOSPITAL ENCOUNTER (EMERGENCY)
Facility: HOSPITAL | Age: 70
Discharge: HOME | End: 2020-10-30
Attending: EMERGENCY MEDICINE
Payer: MEDICARE

## 2020-10-30 VITALS
DIASTOLIC BLOOD PRESSURE: 83 MMHG | HEART RATE: 103 BPM | SYSTOLIC BLOOD PRESSURE: 177 MMHG | WEIGHT: 96 LBS | TEMPERATURE: 99 F | HEIGHT: 59 IN | OXYGEN SATURATION: 100 % | RESPIRATION RATE: 18 BRPM | BODY MASS INDEX: 19.35 KG/M2

## 2020-10-30 DIAGNOSIS — R10.31 RIGHT LOWER QUADRANT ABDOMINAL PAIN: Primary | ICD-10-CM

## 2020-10-30 LAB
ALBUMIN SERPL BCP-MCNC: 4 G/DL (ref 3.5–5.2)
ALP SERPL-CCNC: 66 U/L (ref 55–135)
ALT SERPL W/O P-5'-P-CCNC: 26 U/L (ref 10–44)
ANION GAP SERPL CALC-SCNC: 11 MMOL/L (ref 8–16)
AST SERPL-CCNC: 26 U/L (ref 10–40)
BASOPHILS # BLD AUTO: 0.03 K/UL (ref 0–0.2)
BASOPHILS NFR BLD: 0.4 % (ref 0–1.9)
BILIRUB SERPL-MCNC: 0.2 MG/DL (ref 0.1–1)
BILIRUB UR QL STRIP: NEGATIVE
BUN SERPL-MCNC: 14 MG/DL (ref 8–23)
CALCIUM SERPL-MCNC: 9.1 MG/DL (ref 8.7–10.5)
CHLORIDE SERPL-SCNC: 99 MMOL/L (ref 95–110)
CLARITY UR: CLEAR
CO2 SERPL-SCNC: 27 MMOL/L (ref 23–29)
COLOR UR: YELLOW
CREAT SERPL-MCNC: 0.8 MG/DL (ref 0.5–1.4)
DIFFERENTIAL METHOD: NORMAL
EOSINOPHIL # BLD AUTO: 0.2 K/UL (ref 0–0.5)
EOSINOPHIL NFR BLD: 3 % (ref 0–8)
ERYTHROCYTE [DISTWIDTH] IN BLOOD BY AUTOMATED COUNT: 12.7 % (ref 11.5–14.5)
EST. GFR  (AFRICAN AMERICAN): >60 ML/MIN/1.73 M^2
EST. GFR  (NON AFRICAN AMERICAN): >60 ML/MIN/1.73 M^2
GLUCOSE SERPL-MCNC: 119 MG/DL (ref 70–110)
GLUCOSE UR QL STRIP: NEGATIVE
HCT VFR BLD AUTO: 38.7 % (ref 37–48.5)
HGB BLD-MCNC: 12.9 G/DL (ref 12–16)
HGB UR QL STRIP: NEGATIVE
IMM GRANULOCYTES # BLD AUTO: 0.02 K/UL (ref 0–0.04)
IMM GRANULOCYTES NFR BLD AUTO: 0.3 % (ref 0–0.5)
KETONES UR QL STRIP: NEGATIVE
LEUKOCYTE ESTERASE UR QL STRIP: NEGATIVE
LYMPHOCYTES # BLD AUTO: 3.1 K/UL (ref 1–4.8)
LYMPHOCYTES NFR BLD: 42.2 % (ref 18–48)
MCH RBC QN AUTO: 30.4 PG (ref 27–31)
MCHC RBC AUTO-ENTMCNC: 33.3 G/DL (ref 32–36)
MCV RBC AUTO: 91 FL (ref 82–98)
MONOCYTES # BLD AUTO: 0.7 K/UL (ref 0.3–1)
MONOCYTES NFR BLD: 9.1 % (ref 4–15)
NEUTROPHILS # BLD AUTO: 3.3 K/UL (ref 1.8–7.7)
NEUTROPHILS NFR BLD: 45 % (ref 38–73)
NITRITE UR QL STRIP: NEGATIVE
NRBC BLD-RTO: 0 /100 WBC
PH UR STRIP: 7 [PH] (ref 5–8)
PLATELET # BLD AUTO: 217 K/UL (ref 150–350)
PMV BLD AUTO: 9.6 FL (ref 9.2–12.9)
POTASSIUM SERPL-SCNC: 3.8 MMOL/L (ref 3.5–5.1)
PROT SERPL-MCNC: 8.4 G/DL (ref 6–8.4)
PROT UR QL STRIP: NEGATIVE
RBC # BLD AUTO: 4.25 M/UL (ref 4–5.4)
SODIUM SERPL-SCNC: 137 MMOL/L (ref 136–145)
SP GR UR STRIP: 1.01 (ref 1–1.03)
URN SPEC COLLECT METH UR: NORMAL
UROBILINOGEN UR STRIP-ACNC: NEGATIVE EU/DL
WBC # BLD AUTO: 7.29 K/UL (ref 3.9–12.7)

## 2020-10-30 PROCEDURE — 99284 EMERGENCY DEPT VISIT MOD MDM: CPT | Mod: 25

## 2020-10-30 PROCEDURE — 81003 URINALYSIS AUTO W/O SCOPE: CPT

## 2020-10-30 PROCEDURE — 85025 COMPLETE CBC W/AUTO DIFF WBC: CPT

## 2020-10-30 PROCEDURE — 36415 COLL VENOUS BLD VENIPUNCTURE: CPT

## 2020-10-30 PROCEDURE — 80053 COMPREHEN METABOLIC PANEL: CPT

## 2020-10-30 RX ORDER — BENAZEPRIL HYDROCHLORIDE 40 MG/1
40 TABLET ORAL DAILY
COMMUNITY

## 2020-10-30 RX ORDER — HYDROXYZINE PAMOATE 25 MG/1
25 CAPSULE ORAL DAILY PRN
COMMUNITY

## 2020-10-30 RX ORDER — CLONAZEPAM 0.5 MG/1
0.5 TABLET ORAL NIGHTLY PRN
COMMUNITY

## 2020-10-31 NOTE — DISCHARGE INSTRUCTIONS
You do not have a urinary tract infection.  You do not need more antibiotics.  Follow-up with your regular doctor in the next few days.  Perhaps your abdominal cramping was related to a food allergy since this has happened to you in the past.

## 2020-10-31 NOTE — ED PROVIDER NOTES
Encounter Date: 10/30/2020    SCRIBE #1 NOTE: I, Chantelle Donovan, am scribing for, and in the presence of, Addy Orozco MD.       History     Chief Complaint   Patient presents with    Urinary Tract Infection     Pt. diagnosed with UTI and prescribed Doxycycline 100mg/ 10 days. No relief of s/s. RLQ abdominal pain and urinary frequency.       Time seen by provider: 7:44 PM on 10/30/2020    Martina Osullivan is a 70 y.o. female with a PMHx of DM, HTN and prior bladder infection who presents to the ED with an onset of frequency and right lower abdominal pain that radiates to the back for the last few days. Patient states she was recently diagnosed with a UTI and started on a 10-day course of 100 mg Doxycycline but denies improvement. The patient denies fever, vomiting, diarrhea, pain with urination or any other symptoms at this time. No documented PSHx.      The history is provided by the patient.     Review of patient's allergies indicates:   Allergen Reactions    Beef containing products      Past Medical History:   Diagnosis Date    Diabetes mellitus     Hypertension      No past surgical history on file.  Family History   Problem Relation Age of Onset    Cancer Mother     Diabetes Father      Social History     Tobacco Use    Smoking status: Never Smoker   Substance Use Topics    Alcohol use: No    Drug use: Not on file     Review of Systems   Constitutional: Negative for fever.   HENT: Negative for sore throat.    Respiratory: Negative for shortness of breath.    Cardiovascular: Negative for chest pain.   Gastrointestinal: Positive for abdominal pain. Negative for diarrhea, nausea and vomiting.   Genitourinary: Positive for frequency. Negative for dysuria.   Musculoskeletal: Negative for back pain.   Skin: Negative for rash.   Neurological: Negative for weakness.   Hematological: Does not bruise/bleed easily.       Physical Exam     Initial Vitals [10/30/20 1925]   BP Pulse Resp Temp SpO2   (!) 177/83 93  18 98.7 °F (37.1 °C) 98 %      MAP       --         Physical Exam    Nursing note and vitals reviewed.  Constitutional: She appears well-developed.  Non-toxic appearance.   Nontoxic, well appearing female.    HENT:   Head: Normocephalic and atraumatic.   Eyes: EOM are normal. Pupils are equal, round, and reactive to light.   Neck: Neck supple.   Cardiovascular: Normal rate, regular rhythm, normal heart sounds and intact distal pulses. Exam reveals no gallop and no friction rub.    No murmur heard.  Pulmonary/Chest: Breath sounds normal. No respiratory distress. She has no decreased breath sounds. She has no wheezes. She has no rhonchi. She has no rales.   Abdominal: Soft. Bowel sounds are normal. She exhibits no distension.   No real RLQ tenderness. Mild right flank tenderness. No scarring on abdomen.   Musculoskeletal: Normal range of motion.   Neurological: She is alert and oriented to person, place, and time.   Skin: Skin is warm and dry.   Psychiatric: She has a normal mood and affect.         ED Course   Procedures  Labs Reviewed   COMPREHENSIVE METABOLIC PANEL - Abnormal; Notable for the following components:       Result Value    Glucose 119 (*)     All other components within normal limits   URINALYSIS, REFLEX TO URINE CULTURE    Narrative:     Specimen Source->Urine   CBC W/ AUTO DIFFERENTIAL          Imaging Results          CT Abdomen Pelvis  Without Contrast (Final result)  Result time 10/30/20 20:44:21   Procedure changed from CT Abdomen Pelvis With Contrast     Final result by Blayne Lopez MD (10/30/20 20:44:21)                 Impression:      Normal CT of the appendix with no evidence of right lower quadrant inflammatory change mass or free fluid.    Atherosclerotic vascular disease of the coronaries aorta and iliac system.    Hazy ground-glass opacities in the lung bases bilaterally posteriorly.  This is thought to represent hypoventilation.  Inflammatory or infectious airspace disease is  considered less likely.      Electronically signed by: Blayne Lopez  Date:    10/30/2020  Time:    20:44             Narrative:    EXAMINATION:  CT ABDOMEN PELVIS WITHOUT CONTRAST    CLINICAL HISTORY:  RLQ abdominal pain, appendicitis suspected (Age => 16y);    TECHNIQUE:  Low dose axial images, sagittal and coronal reformations were obtained from the lung bases to the pubic symphysis.    COMPARISON:  Ultrasound of the abdomen, 11/12/2019    FINDINGS:  The appendix is visualized off of the inferior medial border of the cecum and appears normal filled with air with no wall thickening or inflammation.  The terminal ileum appears normal as does the cecum.    The loops of large and small intestines are noteworthy for a few scattered diverticula in the descending colon but without features of diverticulitis.    The uterus is enlarged with multiple large macro calcifications suggesting degenerating fibroadenomas.  The urinary bladder appears unremarkable without contrast.  There is no pelvic mass or free fluid.    The small intestinal loops appear unremarkable with no dilatation.    The liver, spleen, adrenal glands, pancreas, gallbladder and right left kidneys appear unremarkable.  There is atherosclerotic calcification throughout the abdominal aorta as well as the coronary arteries especially on the right.  There is no evidence of retroperitoneal mass or lymph node enlargement.  No ascites in the abdomen is noted.    The bony structures are intact.  No aggressive lesions are evident.    The lung bases demonstrate some mild hazy ground-glass opacities mainly in the gravity dependent portions posteriorly.  No effusion is seen.  No nodule is evident.                                 Medical Decision Making:   History:   Old Medical Records: I decided to obtain old medical records.  Clinical Tests:   Lab Tests: Ordered and Reviewed  Radiological Study: Reviewed and Ordered            Scribe Attestation:   Scribe #1: I  performed the above scribed service and the documentation accurately describes the services I performed. I attest to the accuracy of the note.    I, Dr. Addy Orozco personally performed the services described in this documentation. All medical record entries made by the scribe were at my direction and in my presence.  I have reviewed the chart and agree that the record reflects my personal performance and is accurate and complete. Addy Orozco MD.  10:19 PM 10/31/2020    DISCLAIMER: This note was prepared with Dragon NaturallySpeaking voice recognition transcription software. Garbled syntax, mangled pronouns, and other bizarre constructions may be attributed to that software system         ED Course as of Oct 31 2219   Fri Oct 30, 2020   2058 Patient has no urinary tract infection.  No leukocytosis and CT does not show anything acute.  I do not believe this is ischemic gut.    [JS]   2137 Labs are all normal.  CT normal.  Patient has a normal exam.  She will follow up with primary care.  Unclear why she is having right lower quadrant abdominal pain.  I doubt this is ischemic gut, UTI, renal colic, pyelonephritis, appendicitis, perforated viscus colitis diverticulitis or constipation.  Stable for discharge    [JS]      ED Course User Index  [JS] Addy Orozco MD            Clinical Impression:     ICD-10-CM ICD-9-CM   1. Right lower quadrant abdominal pain  R10.31 789.03                      Disposition:   Disposition: Discharged  Condition: Stable     ED Disposition Condition    Discharge Stable        ED Prescriptions     None        Follow-up Information     Follow up With Specialties Details Why Contact Info    Shaun Espinoza MD Family Medicine Schedule an appointment as soon as possible for a visit   [address] [place] [id number]  [phone]                                         Addy Orozco MD  10/31/20 0101

## 2023-03-28 ENCOUNTER — HOSPITAL ENCOUNTER (EMERGENCY)
Facility: HOSPITAL | Age: 73
Discharge: HOME | End: 2023-03-28
Attending: EMERGENCY MEDICINE
Payer: MEDICARE

## 2023-03-28 VITALS
OXYGEN SATURATION: 96 % | SYSTOLIC BLOOD PRESSURE: 138 MMHG | DIASTOLIC BLOOD PRESSURE: 81 MMHG | HEIGHT: 59 IN | BODY MASS INDEX: 18.95 KG/M2 | RESPIRATION RATE: 17 BRPM | TEMPERATURE: 98 F | WEIGHT: 94 LBS | HEART RATE: 88 BPM

## 2023-03-28 DIAGNOSIS — R42 VERTIGO: Primary | ICD-10-CM

## 2023-03-28 DIAGNOSIS — R42 DIZZINESS: ICD-10-CM

## 2023-03-28 LAB
ALBUMIN SERPL BCP-MCNC: 4 G/DL (ref 3.5–5.2)
ALP SERPL-CCNC: 65 U/L (ref 55–135)
ALT SERPL W/O P-5'-P-CCNC: 20 U/L (ref 10–44)
ANION GAP SERPL CALC-SCNC: 11 MMOL/L (ref 8–16)
AST SERPL-CCNC: 22 U/L (ref 10–40)
BASOPHILS # BLD AUTO: 0.06 K/UL (ref 0–0.2)
BASOPHILS NFR BLD: 0.9 % (ref 0–1.9)
BILIRUB SERPL-MCNC: 0.2 MG/DL (ref 0.1–1)
BILIRUB UR QL STRIP: NEGATIVE
BUN SERPL-MCNC: 14 MG/DL (ref 8–23)
CALCIUM SERPL-MCNC: 9.1 MG/DL (ref 8.7–10.5)
CHLORIDE SERPL-SCNC: 102 MMOL/L (ref 95–110)
CLARITY UR: CLEAR
CO2 SERPL-SCNC: 26 MMOL/L (ref 23–29)
COLOR UR: NORMAL
CREAT SERPL-MCNC: 0.7 MG/DL (ref 0.5–1.4)
DIFFERENTIAL METHOD: ABNORMAL
EOSINOPHIL # BLD AUTO: 0.8 K/UL (ref 0–0.5)
EOSINOPHIL NFR BLD: 11 % (ref 0–8)
ERYTHROCYTE [DISTWIDTH] IN BLOOD BY AUTOMATED COUNT: 12.6 % (ref 11.5–14.5)
EST. GFR  (NO RACE VARIABLE): >60 ML/MIN/1.73 M^2
GLUCOSE SERPL-MCNC: 75 MG/DL (ref 70–110)
GLUCOSE UR QL STRIP: NEGATIVE
HCT VFR BLD AUTO: 40.5 % (ref 37–48.5)
HGB BLD-MCNC: 13.5 G/DL (ref 12–16)
HGB UR QL STRIP: NEGATIVE
IMM GRANULOCYTES # BLD AUTO: 0.01 K/UL (ref 0–0.04)
IMM GRANULOCYTES NFR BLD AUTO: 0.1 % (ref 0–0.5)
KETONES UR QL STRIP: NEGATIVE
LEUKOCYTE ESTERASE UR QL STRIP: NEGATIVE
LYMPHOCYTES # BLD AUTO: 2.9 K/UL (ref 1–4.8)
LYMPHOCYTES NFR BLD: 43.2 % (ref 18–48)
MCH RBC QN AUTO: 29.8 PG (ref 27–31)
MCHC RBC AUTO-ENTMCNC: 33.3 G/DL (ref 32–36)
MCV RBC AUTO: 89 FL (ref 82–98)
MONOCYTES # BLD AUTO: 0.4 K/UL (ref 0.3–1)
MONOCYTES NFR BLD: 6.5 % (ref 4–15)
NEUTROPHILS # BLD AUTO: 2.6 K/UL (ref 1.8–7.7)
NEUTROPHILS NFR BLD: 38.3 % (ref 38–73)
NITRITE UR QL STRIP: NEGATIVE
NRBC BLD-RTO: 0 /100 WBC
PH UR STRIP: 7 [PH] (ref 5–8)
PLATELET # BLD AUTO: 134 K/UL (ref 150–450)
PLATELET BLD QL SMEAR: ABNORMAL
PMV BLD AUTO: 11.8 FL (ref 9.2–12.9)
POTASSIUM SERPL-SCNC: 3.4 MMOL/L (ref 3.5–5.1)
PROT SERPL-MCNC: 8.7 G/DL (ref 6–8.4)
PROT UR QL STRIP: NEGATIVE
RBC # BLD AUTO: 4.53 M/UL (ref 4–5.4)
SODIUM SERPL-SCNC: 139 MMOL/L (ref 136–145)
SP GR UR STRIP: 1 (ref 1–1.03)
URN SPEC COLLECT METH UR: NORMAL
UROBILINOGEN UR STRIP-ACNC: NEGATIVE EU/DL
WBC # BLD AUTO: 6.81 K/UL (ref 3.9–12.7)

## 2023-03-28 PROCEDURE — 80053 COMPREHEN METABOLIC PANEL: CPT | Performed by: NURSE PRACTITIONER

## 2023-03-28 PROCEDURE — 93010 EKG 12-LEAD: ICD-10-PCS | Mod: ,,, | Performed by: INTERNAL MEDICINE

## 2023-03-28 PROCEDURE — 93005 ELECTROCARDIOGRAM TRACING: CPT

## 2023-03-28 PROCEDURE — 99285 EMERGENCY DEPT VISIT HI MDM: CPT | Mod: 25

## 2023-03-28 PROCEDURE — 93010 ELECTROCARDIOGRAM REPORT: CPT | Mod: ,,, | Performed by: INTERNAL MEDICINE

## 2023-03-28 PROCEDURE — 85025 COMPLETE CBC W/AUTO DIFF WBC: CPT | Performed by: NURSE PRACTITIONER

## 2023-03-28 PROCEDURE — 25000003 PHARM REV CODE 250: Performed by: EMERGENCY MEDICINE

## 2023-03-28 PROCEDURE — 81003 URINALYSIS AUTO W/O SCOPE: CPT | Performed by: NURSE PRACTITIONER

## 2023-03-28 PROCEDURE — 36415 COLL VENOUS BLD VENIPUNCTURE: CPT | Performed by: NURSE PRACTITIONER

## 2023-03-28 RX ORDER — MECLIZINE HYDROCHLORIDE 25 MG/1
25 TABLET ORAL
Status: COMPLETED | OUTPATIENT
Start: 2023-03-28 | End: 2023-03-28

## 2023-03-28 RX ORDER — MECLIZINE HYDROCHLORIDE 25 MG/1
25 TABLET ORAL 3 TIMES DAILY PRN
Qty: 20 TABLET | Refills: 0 | Status: SHIPPED | OUTPATIENT
Start: 2023-03-28

## 2023-03-28 RX ADMIN — MECLIZINE HYDROCHLORIDE 25 MG: 25 TABLET ORAL at 02:03

## 2023-03-28 NOTE — FIRST PROVIDER EVALUATION
Emergency Department TeleTriage Encounter Note      CHIEF COMPLAINT    Chief Complaint   Patient presents with    Dizziness     Stated that when she gets up and moves her head up and down she gets dizzy and feels like she may fall        VITAL SIGNS   Initial Vitals [03/28/23 1146]   BP Pulse Resp Temp SpO2   (!) 149/72 90 17 98 °F (36.7 °C) 98 %      MAP       --            ALLERGIES    Review of patient's allergies indicates:   Allergen Reactions    Beef containing products        PROVIDER TRIAGE NOTE  TeleTriage Note: Martina Osullivan, a nontoxic/well appearing, 72 y.o. female, presented to the ED with c/o dizziness when standing and when she leans her head down since yesterday. Denies recent illness.     All ED beds are full at present; patient notified of this status.  Patient seen and medically screened by Nurse Practitioner via teletriage. Orders initiated at triage to expedite care.  Patient is stable to return to the waiting room and will be placed in an ED bed when available.  Care will be transferred to an alternate provider when patient has been placed in an Exam Room from the [facility] for physical exam, additional orders, and disposition.  12:01 PM Blossom Sosa DNP, FNP-C        ORDERS  Labs Reviewed   HIV 1 / 2 ANTIBODY   HEPATITIS C ANTIBODY       ED Orders (720h ago, onward)      Start Ordered     Status Ordering Provider    03/28/23 1204 03/28/23 1203  EKG 12-lead  Once         Ordered BLOSSOM SOSA    03/28/23 1204 03/28/23 1203  Urinalysis, Reflex to Urine Culture Urine, Clean Catch  STAT         Ordered BLOSSOM SOSA    03/28/23 1203 03/28/23 1203  Insert peripheral IV  Continuous         Ordered BLOSSOM SOSA    03/28/23 1203 03/28/23 1203  CBC auto differential  STAT         Ordered BLOSSOM SOSA    03/28/23 1203 03/28/23 1203  Comprehensive metabolic panel  STAT         Ordered BLOSSOM SOSA    03/28/23 1146 03/28/23 1146  HIV 1/2 Ag/Ab (4th Gen)  STAT         Pending  Collection YARI VICTOR.    03/28/23 1146 03/28/23 1146  Hepatitis C Antibody  STAT         Pending Collection YARI VICTOR.              Virtual Visit Note: The provider triage portion of this emergency department evaluation and documentation was performed via [employer], a HIPAA-compliant telemedicine application, in concert with a tele-presenter in the room. A face to face patient evaluation with one of my colleagues will occur once the patient is placed in an emergency department room.      DISCLAIMER: This note was prepared with [employer] voice recognition transcription software. Garbled syntax, mangled pronouns, and other bizarre constructions may be attributed to that software system.

## 2023-03-28 NOTE — ED PROVIDER NOTES
Encounter Date: 3/28/2023    SCRIBE #1 NOTE: I, Americo Lopez, am scribing for, and in the presence of,  Josue Locke MD.     History     Chief Complaint   Patient presents with    Dizziness     Stated that when she gets up and moves her head up and down she gets dizzy and feels like she may fall      Time seen by provider: 1:55 PM on 03/28/2023    Martina Osullivan is a 72 y.o. female who presents to the ED with an onset of intermittent dizziness that began yesterday. The patient describes the dizziness as the room spinning. Her dizziness worsens when standing and is relieved by laying down. She has to hold something while walking. The patient denies nausea, vomiting,fever, ear pain, tinnitus, back pain, chest pain, SOB, abdominal pain, asymmetrical weakness, facial droop, speech difficulty, or any other symptoms at this time. PMHx of HTN and DM. There is no recorded PSHx.    The history is limited by a language barrier.   Review of patient's allergies indicates:   Allergen Reactions    Beef containing products      Past Medical History:   Diagnosis Date    Diabetes mellitus     Hypertension      No past surgical history on file.  Family History   Problem Relation Age of Onset    Cancer Mother     Diabetes Father      Social History     Tobacco Use    Smoking status: Never   Substance Use Topics    Alcohol use: No     Review of Systems   Constitutional:  Negative for activity change, diaphoresis and fever.   HENT:  Negative for ear pain, rhinorrhea, sore throat, tinnitus and trouble swallowing.    Eyes:  Negative for pain and visual disturbance.   Respiratory:  Negative for cough, shortness of breath and stridor.    Cardiovascular:  Negative for chest pain.   Gastrointestinal:  Negative for abdominal pain, blood in stool, diarrhea, nausea and vomiting.   Genitourinary:  Negative for dysuria, hematuria, vaginal bleeding and vaginal discharge.   Musculoskeletal:  Negative for back pain, gait problem and neck pain.    Skin:  Negative for rash and wound.   Neurological:  Positive for dizziness. Negative for seizures, facial asymmetry, speech difficulty, weakness and headaches.   Psychiatric/Behavioral:  Negative for hallucinations and suicidal ideas.      Physical Exam     Initial Vitals [03/28/23 1146]   BP Pulse Resp Temp SpO2   (!) 149/72 90 17 98 °F (36.7 °C) 98 %      MAP       --         Physical Exam    Nursing note and vitals reviewed.  Constitutional: She appears well-developed. She is not diaphoretic. No distress.   HENT:   Head: Normocephalic and atraumatic.   Nose: Nose normal.   Eyes: EOM are normal. Pupils are equal, round, and reactive to light. No scleral icterus.   Neck: Neck supple.   Normal range of motion.  Cardiovascular:  Normal rate, regular rhythm, normal heart sounds and intact distal pulses.     Exam reveals no gallop and no friction rub.       No murmur heard.  Pulmonary/Chest: Breath sounds normal. No stridor. No respiratory distress. She has no wheezes. She has no rhonchi. She has no rales.   Abdominal: Abdomen is soft. Bowel sounds are normal. She exhibits no distension. There is no abdominal tenderness. There is no rebound and no guarding.   Musculoskeletal:         General: Normal range of motion.      Cervical back: Normal range of motion and neck supple.     Neurological: She is alert and oriented to person, place, and time. She has normal strength. No cranial nerve deficit or sensory deficit. GCS score is 15. GCS eye subscore is 4. GCS verbal subscore is 5. GCS motor subscore is 6.   Cranial nerves II-XII grossly intact. Finger-to-nose normal. Tone normal. Sensation intact to light touch. No drift. No disdiadochokinesia. 5/5 BUE and BLE strength. Normal gait. Negative Romberg. Speech and cognition is normal. No focal neurologic deficit.   Skin: Skin is warm and dry. Capillary refill takes less than 2 seconds. No rash noted.   Psychiatric: She has a normal mood and affect.       ED Course    Procedures  Labs Reviewed   CBC W/ AUTO DIFFERENTIAL - Abnormal; Notable for the following components:       Result Value    Platelets 134 (*)     Eos # 0.8 (*)     Eosinophil % 11.0 (*)     Platelet Estimate Clumped (*)     All other components within normal limits    Narrative:     Release to patient->Immediate   COMPREHENSIVE METABOLIC PANEL - Abnormal; Notable for the following components:    Potassium 3.4 (*)     Total Protein 8.7 (*)     All other components within normal limits    Narrative:     Release to patient->Immediate   URINALYSIS, REFLEX TO URINE CULTURE    Narrative:     Specimen Source->Urine     EKG Readings: (Independently Interpreted)   Normal sinus rhythm at rate of 84 bpm with normal axis and normal intervals. Possible LAE. Nonspecific ST abnormality. No acute ST segment changes.      Imaging Results              MRI Brain Without Contrast (Final result)  Result time 03/28/23 17:09:46      Final result by Isidro Casas MD (03/28/23 17:09:46)                   Impression:      1. No acute intracranial findings, specifically no recent infarct.  2. Mild chronic small vessel ischemic and involutional changes.      Electronically signed by: Isidro Casas  Date:    03/28/2023  Time:    17:09               Narrative:    EXAMINATION:  MRI BRAIN WITHOUT CONTRAST    CLINICAL HISTORY:  Dizziness, persistent/recurrent, cardiac or vascular cause suspected;.    TECHNIQUE:  Multiplanar multisequence MR imaging of the brain was performed without contrast    COMPARISON:  CT head 11/29/2019.    FINDINGS:  Somewhat motion degraded exam.    Brain: There are no concerning areas of abnormal or restricted diffusion within the brain.  Age-related chronic small vessel ischemic changes within the cerebral hemispheric white matter.  No mass, hemorrhage or acute infarct is present.  No space-occupying extra-axial fluid collections.  Midline Structures: The midline structures of the brain are normal.  Ventricles: The  ventricles, sulci and cisterns prominent consistent with mild global parenchymal volume loss.  No acute hydrocephalus.  Incidentally noted cavum septum pellucidum/vergae variant.  Vasculature: The vascular flow voids at the base of the brain are within normal limits.  Calvarium: The visualized osseous structures are unremarkable.  Sinuses: The paranasal sinuses are adequately aerated.  Orbits: The orbits and globes are unremarkable.  Mastoids: The mastoid air cells are adequately aerated.  Extracranial soft tissues: The visualized extracranial soft tissues are unremarkable.                                       Medications   meclizine tablet 25 mg (25 mg Oral Given 3/28/23 1414)     Medical Decision Making:   History:   Old Medical Records: I decided to obtain old medical records.  Independently Interpreted Test(s):   I have ordered and independently interpreted EKG Reading(s) - see prior notes  Clinical Tests:   Lab Tests: Ordered and Reviewed  Medical Tests: Ordered and Reviewed  ED Management:  No LOC and no acute neurologic deficits c/f syncope, seizure, stroke.  However given age ordered MRI which was negative for acute pathology.  Unlikely AOM: no ear pain, not the most common age, no TM bulging  Unlikely labyrinthitis: no hearing loss or preceding URI  Unlikely tumor/mass: more acute in onset  Unlikely meningitis: Pt afebrile, nontoxic appearing, no meningismus  Unlikely GI bleed  Unlikely other infectious process    Symptoms consistent with peripheral vertigo. Do not suspect central cause at this time. Nonfocal exam without ataxia. Normal FTN and neg Rombergs.   Pt reports symptoms are positional, sudden and severe. Denies any auditory findings. TMs clear and intact BL. Pt feels much improved with meclizine. Nontoxic appearing. Labs reassuring.  Given strict return precautions and patient will fu closely with ENT and PCP for further outpatient testing and management.             Scribe Attestation:   Scribe  #1: I performed the above scribed service and the documentation accurately describes the services I performed. I attest to the accuracy of the note.      ED Course as of 03/28/23 1727   Tue Mar 28, 2023   1725 MRI Brain Without Contrast [BD]   1725 Impression:     1. No acute intracranial findings, specifically no recent infarct.  2. Mild chronic small vessel ischemic and involutional changes.        Electronically signed by: Isidro Casas  Date:                                            03/28/2023  Time:                                           17:09 [BD]      ED Course User Index  [BD] Josue Locke MD            Attending Attestation:     Physician Attestation for Scribe:    I, Dr. Josue Locke, personally performed the services described in this documentation.   All medical record entries made by the scribe were at my direction and in my presence.   I have reviewed the chart and agree that the record is accurate and complete.   Josue Locke MD  5:27 PM 03/28/2023     DISCLAIMER: This note was prepared with [employer] Speaking voice recognition transcription software. Garbled syntax, mangled pronouns, and other bizarre constructions may be attributed to that software system.         Clinical Impression:   Final diagnoses:  [R42] Dizziness  [R42] Vertigo (Primary)        ED Disposition Condition    Discharge Stable          ED Prescriptions       Medication Sig Dispense Start Date End Date Auth. Provider    meclizine (ANTIVERT) 25 mg tablet Take 1 tablet (25 mg total) by mouth 3 (three) times daily as needed for Dizziness. 20 tablet 3/28/2023 -- Josue Locke MD          Follow-up Information       Follow up With Specialties Details Why Contact Info    Shaun Espinoza MD Family Medicine Go in 2 days  [address] [facility] [address]  [phone]      Ashok Burleson MD Otolaryngology Schedule an appointment as soon as possible for a visit   [address] [facility] [facility]  [address]  [place]  [place] [address]  [phone]      [facility] Emergency Dept Emergency Medicine Go to  As needed, If symptoms worsen [address] [place] [address] Drive  [facility] [address]  [phone]             Josue Locke MD  03/28/23 2841

## 2024-12-11 ENCOUNTER — HOSPITAL ENCOUNTER (EMERGENCY)
Facility: HOSPITAL | Age: 74
Discharge: HOME | End: 2024-12-11
Attending: EMERGENCY MEDICINE
Payer: MEDICARE

## 2024-12-11 VITALS
WEIGHT: 93.56 LBS | HEART RATE: 89 BPM | SYSTOLIC BLOOD PRESSURE: 139 MMHG | BODY MASS INDEX: 18.9 KG/M2 | TEMPERATURE: 98 F | RESPIRATION RATE: 18 BRPM | OXYGEN SATURATION: 99 % | DIASTOLIC BLOOD PRESSURE: 72 MMHG

## 2024-12-11 DIAGNOSIS — M51.360 DEGENERATION OF INTERVERTEBRAL DISC OF LUMBAR REGION WITH DISCOGENIC BACK PAIN: Primary | ICD-10-CM

## 2024-12-11 LAB
ALBUMIN SERPL BCP-MCNC: 3.5 G/DL (ref 3.5–5.2)
ALP SERPL-CCNC: 56 U/L (ref 40–150)
ALT SERPL W/O P-5'-P-CCNC: 21 U/L (ref 10–44)
ANION GAP SERPL CALC-SCNC: 10 MMOL/L (ref 8–16)
AST SERPL-CCNC: 21 U/L (ref 10–40)
BASOPHILS # BLD AUTO: 0.04 K/UL (ref 0–0.2)
BASOPHILS NFR BLD: 0.8 % (ref 0–1.9)
BILIRUB SERPL-MCNC: 0.4 MG/DL (ref 0.1–1)
BILIRUB UR QL STRIP: NEGATIVE
BUN SERPL-MCNC: 13 MG/DL (ref 8–23)
CALCIUM SERPL-MCNC: 8.7 MG/DL (ref 8.7–10.5)
CHLORIDE SERPL-SCNC: 94 MMOL/L (ref 95–110)
CLARITY UR: CLEAR
CO2 SERPL-SCNC: 23 MMOL/L (ref 23–29)
COLOR UR: YELLOW
CREAT SERPL-MCNC: 0.7 MG/DL (ref 0.5–1.4)
DIFFERENTIAL METHOD BLD: ABNORMAL
EOSINOPHIL # BLD AUTO: 0.4 K/UL (ref 0–0.5)
EOSINOPHIL NFR BLD: 8 % (ref 0–8)
ERYTHROCYTE [DISTWIDTH] IN BLOOD BY AUTOMATED COUNT: 12.9 % (ref 11.5–14.5)
EST. GFR  (NO RACE VARIABLE): >60 ML/MIN/1.73 M^2
GLUCOSE SERPL-MCNC: 163 MG/DL (ref 70–110)
GLUCOSE UR QL STRIP: NEGATIVE
HCT VFR BLD AUTO: 36.8 % (ref 37–48.5)
HGB BLD-MCNC: 12.7 G/DL (ref 12–16)
HGB UR QL STRIP: NEGATIVE
IMM GRANULOCYTES # BLD AUTO: 0 K/UL (ref 0–0.04)
IMM GRANULOCYTES NFR BLD AUTO: 0 % (ref 0–0.5)
KETONES UR QL STRIP: NEGATIVE
LEUKOCYTE ESTERASE UR QL STRIP: ABNORMAL
LYMPHOCYTES # BLD AUTO: 2 K/UL (ref 1–4.8)
LYMPHOCYTES NFR BLD: 38.1 % (ref 18–48)
MCH RBC QN AUTO: 30.8 PG (ref 27–31)
MCHC RBC AUTO-ENTMCNC: 34.5 G/DL (ref 32–36)
MCV RBC AUTO: 89 FL (ref 82–98)
MICROSCOPIC COMMENT: ABNORMAL
MONOCYTES # BLD AUTO: 0.4 K/UL (ref 0.3–1)
MONOCYTES NFR BLD: 7.8 % (ref 4–15)
NEUTROPHILS # BLD AUTO: 2.3 K/UL (ref 1.8–7.7)
NEUTROPHILS NFR BLD: 45.3 % (ref 38–73)
NITRITE UR QL STRIP: NEGATIVE
NRBC BLD-RTO: 0 /100 WBC
PH UR STRIP: 6 [PH] (ref 5–8)
PLATELET # BLD AUTO: 211 K/UL (ref 150–450)
PMV BLD AUTO: 9.6 FL (ref 9.2–12.9)
POTASSIUM SERPL-SCNC: 3.8 MMOL/L (ref 3.5–5.1)
PROT SERPL-MCNC: 7.2 G/DL (ref 6–8.4)
PROT UR QL STRIP: NEGATIVE
RBC # BLD AUTO: 4.12 M/UL (ref 4–5.4)
RBC #/AREA URNS HPF: 6 /HPF (ref 0–4)
SODIUM SERPL-SCNC: 127 MMOL/L (ref 136–145)
SP GR UR STRIP: 1.01 (ref 1–1.03)
SQUAMOUS #/AREA URNS HPF: 4 /HPF
URN SPEC COLLECT METH UR: ABNORMAL
UROBILINOGEN UR STRIP-ACNC: NEGATIVE EU/DL
WBC # BLD AUTO: 5.12 K/UL (ref 3.9–12.7)
WBC #/AREA URNS HPF: 9 /HPF (ref 0–5)

## 2024-12-11 PROCEDURE — 81000 URINALYSIS NONAUTO W/SCOPE: CPT | Performed by: EMERGENCY MEDICINE

## 2024-12-11 PROCEDURE — 36415 COLL VENOUS BLD VENIPUNCTURE: CPT | Performed by: EMERGENCY MEDICINE

## 2024-12-11 PROCEDURE — 80053 COMPREHEN METABOLIC PANEL: CPT | Performed by: EMERGENCY MEDICINE

## 2024-12-11 PROCEDURE — 99284 EMERGENCY DEPT VISIT MOD MDM: CPT | Mod: 25

## 2024-12-11 PROCEDURE — 85025 COMPLETE CBC W/AUTO DIFF WBC: CPT | Performed by: EMERGENCY MEDICINE

## 2024-12-11 PROCEDURE — 96372 THER/PROPH/DIAG INJ SC/IM: CPT | Performed by: EMERGENCY MEDICINE

## 2024-12-11 PROCEDURE — 63600175 PHARM REV CODE 636 W HCPCS: Performed by: EMERGENCY MEDICINE

## 2024-12-11 RX ORDER — MELOXICAM 7.5 MG/1
7.5 TABLET ORAL DAILY
Qty: 30 TABLET | Refills: 0 | Status: SHIPPED | OUTPATIENT
Start: 2024-12-11

## 2024-12-11 RX ORDER — KETOROLAC TROMETHAMINE 30 MG/ML
15 INJECTION, SOLUTION INTRAMUSCULAR; INTRAVENOUS
Status: COMPLETED | OUTPATIENT
Start: 2024-12-11 | End: 2024-12-11

## 2024-12-11 RX ADMIN — KETOROLAC TROMETHAMINE 15 MG: 30 INJECTION, SOLUTION INTRAMUSCULAR; INTRAVENOUS at 02:12

## 2024-12-11 NOTE — ED PROVIDER NOTES
Encounter Date: 12/11/2024       History     Chief Complaint   Patient presents with    Back Pain     Lower back pain x 1 week     HPI 74-year-old woman presents emergency department complaining of 1 week of lumbar back pain.  No associated trauma, no fever, no urinary or bowel incontinence or retention.  She denies any associated weakness or numbness.  Has been taking over-the-counter medications such as Tylenol without relief.  Was prescribed a pain medicine by her primary care doctor but prefers to take Tylenol.  Sometimes she does endorse some suprapubic/lower abdominal pain.  No associated fever.  Review of patient's allergies indicates:   Allergen Reactions    Beef containing products      Past Medical History:   Diagnosis Date    Diabetes mellitus     Hypertension      History reviewed. No pertinent surgical history.  Family History   Problem Relation Name Age of Onset    Cancer Mother      Diabetes Father       Social History     Tobacco Use    Smoking status: Never   Substance Use Topics    Alcohol use: No     Review of Systems   Constitutional:  Negative for fever.   HENT:  Negative for sore throat.    Respiratory:  Negative for shortness of breath.    Cardiovascular:  Negative for chest pain.   Gastrointestinal:  Positive for abdominal pain. Negative for nausea.   Genitourinary:  Negative for dysuria.   Musculoskeletal:  Positive for back pain.   Skin:  Negative for rash.   Neurological:  Negative for weakness.   Hematological:  Does not bruise/bleed easily.       Physical Exam     Initial Vitals [12/11/24 1115]   BP Pulse Resp Temp SpO2   (!) 144/76 93 16 98.1 °F (36.7 °C) 96 %      MAP       --         Physical Exam    Constitutional: Vital signs are normal. She appears well-developed and well-nourished.  Non-toxic appearance. No distress.   HENT:   Head: Normocephalic and atraumatic.   Eyes: EOM are normal. Pupils are equal, round, and reactive to light.   Neck: Neck supple. No JVD present.   Normal  range of motion.  Cardiovascular:  Normal rate, regular rhythm, normal heart sounds and intact distal pulses.     Exam reveals no gallop and no friction rub.       No murmur heard.  Pulmonary/Chest: Breath sounds normal. She has no wheezes. She has no rhonchi. She has no rales.   Abdominal: Abdomen is soft. Bowel sounds are normal. There is no abdominal tenderness. There is no rebound and no guarding.   Musculoskeletal:         General: Normal range of motion.      Cervical back: Normal range of motion and neck supple. No rigidity.      Lumbar back: Bony tenderness present. No swelling, edema, deformity, signs of trauma or spasms. Normal range of motion.        Back:      Neurological: She is alert and oriented to person, place, and time. She has normal strength and normal reflexes. No cranial nerve deficit or sensory deficit. She exhibits normal muscle tone. Coordination normal. GCS score is 15. GCS eye subscore is 4. GCS verbal subscore is 5. GCS motor subscore is 6.   Skin: Skin is warm and dry.   Psychiatric: She has a normal mood and affect. Her speech is normal and behavior is normal. She is not actively hallucinating.         ED Course   Procedures  Labs Reviewed   URINALYSIS, REFLEX TO URINE CULTURE - Abnormal       Result Value    Specimen UA Urine, Clean Catch      Color, UA Yellow      Appearance, UA Clear      pH, UA 6.0      Specific Gravity, UA 1.015      Protein, UA Negative      Glucose, UA Negative      Ketones, UA Negative      Bilirubin (UA) Negative      Occult Blood UA Negative      Nitrite, UA Negative      Urobilinogen, UA Negative      Leukocytes, UA 3+ (*)     Narrative:     Specimen Source->Urine   CBC W/ AUTO DIFFERENTIAL - Abnormal    WBC 5.12      RBC 4.12      Hemoglobin 12.7      Hematocrit 36.8 (*)     MCV 89      MCH 30.8      MCHC 34.5      RDW 12.9      Platelets 211      MPV 9.6      Immature Granulocytes 0.0      Gran # (ANC) 2.3      Immature Grans (Abs) 0.00      Lymph # 2.0       Mono # 0.4      Eos # 0.4      Baso # 0.04      nRBC 0      Gran % 45.3      Lymph % 38.1      Mono % 7.8      Eosinophil % 8.0      Basophil % 0.8      Differential Method Automated     COMPREHENSIVE METABOLIC PANEL - Abnormal    Sodium 127 (*)     Potassium 3.8      Chloride 94 (*)     CO2 23      Glucose 163 (*)     BUN 13      Creatinine 0.7      Calcium 8.7      Total Protein 7.2      Albumin 3.5      Total Bilirubin 0.4      Alkaline Phosphatase 56      AST 21      ALT 21      eGFR >60      Anion Gap 10     URINALYSIS MICROSCOPIC - Abnormal    RBC, UA 6 (*)     WBC, UA 9 (*)     Squam Epithel, UA 4      Microscopic Comment SEE COMMENT      Narrative:     Specimen Source->Urine          Imaging Results              X-Ray Lumbar Spine Ap And Lateral (Final result)  Result time 12/11/24 13:14:37      Final result by Isidro Casas MD (12/11/24 13:14:37)                   Impression:      Degenerative changes as above without evidence of an acute process on the basis of plain film radiography.      Electronically signed by: Isidro Casas  Date:    12/11/2024  Time:    13:14               Narrative:    EXAMINATION:  XR LUMBAR SPINE AP AND LATERAL    CLINICAL HISTORY:  low back pain aroun l2/l3;    TECHNIQUE:  AP, lateral and spot images were performed of the lumbar spine.    COMPARISON:  CT 10/30/2020.    FINDINGS:  Mild broad dextrocurvature Sharif grade 1 anterolisthesis of L4 on L5.  No fractures or bony destructive changes identified.  Severe disc height loss at L4-L5 and moderate to severe disc height loss at L5-S1 with lower lumbar predominant facet arthrosis.  Atherosclerotic changes.  Calcified uterine fibroid/vascular calcifications within the pelvis noted.                                       Medications   ketorolac injection 15 mg (15 mg Intramuscular Given 12/11/24 3427)     Medical Decision Making  74-year-old woman presents emergency department complaining of 1 week of lumbar back pain.  No associated  trauma, no fever, no urinary or bowel incontinence or retention.  She denies any associated weakness or numbness.  Has been taking over-the-counter medications such as Tylenol without relief.  Was prescribed a pain medicine by her primary care doctor but prefers to take Tylenol.  Sometimes she does endorse some suprapubic/lower abdominal pain.  No associated fever.  On examination she has central spinal tenderness in the mid lumbar region.  No paraspinal tenderness.  There is no urinary or bowel incontinence, no weakness or numbness.  Reflexes are intact.  I doubt acute cord compression syndrome on the differential.  She is afebrile with a normal white blood cell count urinalysis is nitrite negative with 6 red blood cells 9 white blood cells and 4 squamous epithelial, I do not think she has a UTI.  Kidney function is normal with a creatinine 0.7 BUN of 13.  She does have low sodium 127 with chloride 94, encouraged to increased oral hydration and salt intake.  X-rays are obtained to rule out any pathological fractures and were negative.  This did have significant and severe disc height loss at L5 and S1 and L4 on L5 indicating degenerative disc disease.  I believe this is likely causing her pain.  She will be provided pain medicine and referral to physical medicine rehabilitation.  Return precautions discussed.  Discharged in no acute distress.    Amount and/or Complexity of Data Reviewed  Labs: ordered.  Radiology: ordered.    Risk  Prescription drug management.                                      Clinical Impression:  Final diagnoses:  [M51.360] Degeneration of intervertebral disc of lumbar region with discogenic back pain (Primary)          ED Disposition Condition    Discharge Stable          ED Prescriptions       Medication Sig Dispense Start Date End Date Auth. Provider    meloxicam (MOBIC) 7.5 MG tablet Take 1 tablet (7.5 mg total) by mouth once daily. 30 tablet 12/11/2024 -- Yang Agrawal MD           Follow-up Information       Follow up With Specialties Details Why Contact Info Additional Information    [facility]  As needed, If symptoms worsen [address] [place] [address] [address] [place] [address] 1st floor    Shaun Espinoza MD Family Medicine  As needed [address] [facility] [address]  [phone]                Yang Agrawal MD  12/11/24 2185

## 2024-12-11 NOTE — ED NOTES
Patient identifiers for Martina Osullivan checked and correct.  Pt presenting in the er for R sided lower back pain that has been ongoing x1 week. Pt reports taking OTC medication without relief.   LOC:  Martina Osullivan is awake, alert, and aware of environment with an appropriate affect. She is oriented x 3 and speaking appropriately.  APPEARANCE:  She is resting comfortably and in no acute distress. She is clean and well groomed, patient's clothing is properly fastened.  SKIN:  The skin is warm and dry. She has normal skin turgor and moist mucus membranes. Skin is intact; no bruising or breakdown noted.  MUSCULOSKELETAL:  She is moving all extremities well, no obvious deformities noted. Pulses intact.   RESPIRATORY:  Airway is open and patent. Respirations are spontaneous and non-labored with normal effort and rate.  CARDIAC:  She has a normal rate and rhythm. No peripheral edema noted. Capillary refill < 3 seconds.  ABDOMEN:  No distention noted.  Soft and non-tender upon palpation.  NEUROLOGICAL:  PERRL. Facial expression is symmetrical. Hand grasps are equal bilaterally. Normal sensation in all extremities when touched with finger.  Allergies reported:    Review of patient's allergies indicates:   Allergen Reactions    Beef containing products

## 2024-12-20 ENCOUNTER — HOSPITAL ENCOUNTER (EMERGENCY)
Facility: HOSPITAL | Age: 74
Discharge: HOME | End: 2024-12-20
Attending: STUDENT IN AN ORGANIZED HEALTH CARE EDUCATION/TRAINING PROGRAM
Payer: MEDICARE

## 2024-12-20 VITALS
HEART RATE: 94 BPM | RESPIRATION RATE: 20 BRPM | WEIGHT: 101.94 LBS | DIASTOLIC BLOOD PRESSURE: 75 MMHG | BODY MASS INDEX: 20.55 KG/M2 | HEIGHT: 59 IN | TEMPERATURE: 98 F | OXYGEN SATURATION: 99 % | SYSTOLIC BLOOD PRESSURE: 137 MMHG

## 2024-12-20 DIAGNOSIS — R31.9 URINARY TRACT INFECTION WITH HEMATURIA, SITE UNSPECIFIED: ICD-10-CM
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
## 2024-12-20 DIAGNOSIS — R07.9 CHEST PAIN: ICD-10-CM

## 2024-12-20 DIAGNOSIS — N39.0 URINARY TRACT INFECTION WITH HEMATURIA, SITE UNSPECIFIED: ICD-10-CM

## 2024-12-20 DIAGNOSIS — I10 HYPERTENSION, UNSPECIFIED TYPE: Primary | ICD-10-CM

## 2024-12-20 LAB
ALBUMIN SERPL BCP-MCNC: 3.8 G/DL (ref 3.5–5.2)
ALP SERPL-CCNC: 64 U/L (ref 40–150)
ALT SERPL W/O P-5'-P-CCNC: 19 U/L (ref 10–44)
ANION GAP SERPL CALC-SCNC: 12 MMOL/L (ref 8–16)
AST SERPL-CCNC: 20 U/L (ref 10–40)
BACTERIA #/AREA URNS HPF: ABNORMAL /HPF
BASOPHILS # BLD AUTO: 0.06 K/UL (ref 0–0.2)
BASOPHILS NFR BLD: 0.9 % (ref 0–1.9)
BILIRUB SERPL-MCNC: 0.3 MG/DL (ref 0.1–1)
BILIRUB UR QL STRIP: NEGATIVE
BNP SERPL-MCNC: 23 PG/ML (ref 0–99)
BUN SERPL-MCNC: 14 MG/DL (ref 8–23)
CALCIUM SERPL-MCNC: 9.6 MG/DL (ref 8.7–10.5)
CHLORIDE SERPL-SCNC: 99 MMOL/L (ref 95–110)
CLARITY UR: CLEAR
CO2 SERPL-SCNC: 26 MMOL/L (ref 23–29)
COLOR UR: COLORLESS
CREAT SERPL-MCNC: 0.7 MG/DL (ref 0.5–1.4)
DIFFERENTIAL METHOD BLD: NORMAL
EOSINOPHIL # BLD AUTO: 0.4 K/UL (ref 0–0.5)
EOSINOPHIL NFR BLD: 5.8 % (ref 0–8)
ERYTHROCYTE [DISTWIDTH] IN BLOOD BY AUTOMATED COUNT: 13.2 % (ref 11.5–14.5)
EST. GFR  (NO RACE VARIABLE): >60 ML/MIN/1.73 M^2
GLUCOSE SERPL-MCNC: 111 MG/DL (ref 70–110)
GLUCOSE UR QL STRIP: NEGATIVE
HCT VFR BLD AUTO: 37.7 % (ref 37–48.5)
HGB BLD-MCNC: 12.6 G/DL (ref 12–16)
HGB UR QL STRIP: NEGATIVE
IMM GRANULOCYTES # BLD AUTO: 0.02 K/UL (ref 0–0.04)
IMM GRANULOCYTES NFR BLD AUTO: 0.3 % (ref 0–0.5)
KETONES UR QL STRIP: NEGATIVE
LEUKOCYTE ESTERASE UR QL STRIP: ABNORMAL
LYMPHOCYTES # BLD AUTO: 2.2 K/UL (ref 1–4.8)
LYMPHOCYTES NFR BLD: 33.2 % (ref 18–48)
MCH RBC QN AUTO: 30.4 PG (ref 27–31)
MCHC RBC AUTO-ENTMCNC: 33.4 G/DL (ref 32–36)
MCV RBC AUTO: 91 FL (ref 82–98)
MICROSCOPIC COMMENT: ABNORMAL
MONOCYTES # BLD AUTO: 0.5 K/UL (ref 0.3–1)
MONOCYTES NFR BLD: 7.9 % (ref 4–15)
NEUTROPHILS # BLD AUTO: 3.5 K/UL (ref 1.8–7.7)
NEUTROPHILS NFR BLD: 51.9 % (ref 38–73)
NITRITE UR QL STRIP: NEGATIVE
NRBC BLD-RTO: 0 /100 WBC
PH UR STRIP: 7 [PH] (ref 5–8)
PLATELET # BLD AUTO: 205 K/UL (ref 150–450)
PMV BLD AUTO: 9.2 FL (ref 9.2–12.9)
POTASSIUM SERPL-SCNC: 3.8 MMOL/L (ref 3.5–5.1)
PROT SERPL-MCNC: 8.2 G/DL (ref 6–8.4)
PROT UR QL STRIP: NEGATIVE
RBC # BLD AUTO: 4.15 M/UL (ref 4–5.4)
RBC #/AREA URNS HPF: 2 /HPF (ref 0–4)
SODIUM SERPL-SCNC: 137 MMOL/L (ref 136–145)
SP GR UR STRIP: 1.01 (ref 1–1.03)
SQUAMOUS #/AREA URNS HPF: 8 /HPF
TROPONIN I SERPL DL<=0.01 NG/ML-MCNC: <0.006 NG/ML (ref 0–0.03)
URN SPEC COLLECT METH UR: ABNORMAL
UROBILINOGEN UR STRIP-ACNC: NEGATIVE EU/DL
WBC # BLD AUTO: 6.71 K/UL (ref 3.9–12.7)
WBC #/AREA URNS HPF: 23 /HPF (ref 0–5)

## 2024-12-20 PROCEDURE — 93005 ELECTROCARDIOGRAM TRACING: CPT

## 2024-12-20 PROCEDURE — 85025 COMPLETE CBC W/AUTO DIFF WBC: CPT | Performed by: STUDENT IN AN ORGANIZED HEALTH CARE EDUCATION/TRAINING PROGRAM

## 2024-12-20 PROCEDURE — 93010 ELECTROCARDIOGRAM REPORT: CPT | Mod: ,,, | Performed by: INTERNAL MEDICINE

## 2024-12-20 PROCEDURE — 83880 ASSAY OF NATRIURETIC PEPTIDE: CPT | Performed by: STUDENT IN AN ORGANIZED HEALTH CARE EDUCATION/TRAINING PROGRAM

## 2024-12-20 PROCEDURE — 99285 EMERGENCY DEPT VISIT HI MDM: CPT | Mod: 25

## 2024-12-20 PROCEDURE — 25500020 PHARM REV CODE 255

## 2024-12-20 PROCEDURE — 87086 URINE CULTURE/COLONY COUNT: CPT | Performed by: STUDENT IN AN ORGANIZED HEALTH CARE EDUCATION/TRAINING PROGRAM

## 2024-12-20 PROCEDURE — 80053 COMPREHEN METABOLIC PANEL: CPT | Performed by: STUDENT IN AN ORGANIZED HEALTH CARE EDUCATION/TRAINING PROGRAM

## 2024-12-20 PROCEDURE — 25000003 PHARM REV CODE 250: Performed by: STUDENT IN AN ORGANIZED HEALTH CARE EDUCATION/TRAINING PROGRAM

## 2024-12-20 PROCEDURE — 36415 COLL VENOUS BLD VENIPUNCTURE: CPT | Performed by: STUDENT IN AN ORGANIZED HEALTH CARE EDUCATION/TRAINING PROGRAM

## 2024-12-20 PROCEDURE — 94760 N-INVAS EAR/PLS OXIMETRY 1: CPT

## 2024-12-20 PROCEDURE — 84484 ASSAY OF TROPONIN QUANT: CPT | Performed by: STUDENT IN AN ORGANIZED HEALTH CARE EDUCATION/TRAINING PROGRAM

## 2024-12-20 PROCEDURE — 81000 URINALYSIS NONAUTO W/SCOPE: CPT | Performed by: STUDENT IN AN ORGANIZED HEALTH CARE EDUCATION/TRAINING PROGRAM

## 2024-12-20 RX ORDER — NITROFURANTOIN 25; 75 MG/1; MG/1
100 CAPSULE ORAL 2 TIMES DAILY
Qty: 10 CAPSULE | Refills: 0 | Status: SHIPPED | OUTPATIENT
Start: 2024-12-20 | End: 2024-12-25

## 2024-12-20 RX ORDER — NITROFURANTOIN 25; 75 MG/1; MG/1
100 CAPSULE ORAL ONCE
Status: COMPLETED | OUTPATIENT
Start: 2024-12-20 | End: 2024-12-20

## 2024-12-20 RX ADMIN — NITROFURANTOIN MONOHYDRATE/MACROCRYSTALS 100 MG: 25; 75 CAPSULE ORAL at 10:12

## 2024-12-20 RX ADMIN — IOHEXOL 75 ML: 350 INJECTION, SOLUTION INTRAVENOUS at 08:12

## 2024-12-21 NOTE — DISCHARGE INSTRUCTIONS
You were seen for high blood pressure.  Our lab work did not show evidence of damage to your heart. Take your antibiotic twice a day for 5 days.    Please return to this facility or another ED as needed for any new or worsening symptoms including chest pain, shortness of breath, abdominal pain, nausea or vomiting, fever or chills. Please follow up with your primary care doctor in 3 days. Please take all medicines as prescribed.

## 2024-12-21 NOTE — ED PROVIDER NOTES
Encounter Date: 12/20/2024       History     Chief Complaint   Patient presents with    Hypertension     Started today     HPI    74-year-old woman with a history of diabetes and hypertension presents for evaluation of hypertension.  She reports she took her blood pressure today and no was in the 190s.  She denies headache changes in vision chest pain shortness of breath nausea vomiting belly pain change in bowel or bladder habits.  She reports lower back pain.  She reports that she had an episode where she got dizzy with room spinning and fell 2 days ago, she denies striking her head or LOC. dizziness is not recurred.  She has not take any aspirin Plavix or blood thinners.  She reports compliance with her antihypertensives.  Review of patient's allergies indicates:   Allergen Reactions    Beef containing products      Past Medical History:   Diagnosis Date    Diabetes mellitus     Hypertension      History reviewed. No pertinent surgical history.  Family History   Problem Relation Name Age of Onset    Cancer Mother      Diabetes Father       Social History     Tobacco Use    Smoking status: Never   Substance Use Topics    Alcohol use: No     Review of Systems   All other systems reviewed and are negative.      Physical Exam     Initial Vitals [12/20/24 1909]   BP Pulse Resp Temp SpO2   (!) 199/95 90 18 98.2 °F (36.8 °C) 100 %      MAP       --         Physical Exam    Nursing note and vitals reviewed.  Constitutional: She appears well-developed. She is not diaphoretic.   HENT:   Head: Normocephalic and atraumatic.   Eyes: Right eye exhibits no discharge. Left eye exhibits no discharge.   Neck: No tracheal deviation present.   No spinal midline tenderness to palpation step-off or deformity   Cardiovascular:  Normal rate, regular rhythm and intact distal pulses.           Pulmonary/Chest: Breath sounds normal. No stridor. No respiratory distress.   Abdominal: Abdomen is soft. There is no abdominal tenderness.    Musculoskeletal:         General: No tenderness.     Neurological: She is alert and oriented to person, place, and time.   AOx3 GCS 15  CN 2-12 normal  No upper extremity lower extremity drift  Intact sensation to light touch  FtN normal  HtS normal  Ambulates     Skin: Skin is warm and dry.         ED Course   Procedures  Labs Reviewed   COMPREHENSIVE METABOLIC PANEL - Abnormal       Result Value    Sodium 137      Potassium 3.8      Chloride 99      CO2 26      Glucose 111 (*)     BUN 14      Creatinine 0.7      Calcium 9.6      Total Protein 8.2      Albumin 3.8      Total Bilirubin 0.3      Alkaline Phosphatase 64      AST 20      ALT 19      eGFR >60      Anion Gap 12     URINALYSIS, REFLEX TO URINE CULTURE - Abnormal    Specimen UA Urine, Clean Catch      Color, UA Colorless (*)     Appearance, UA Clear      pH, UA 7.0      Specific Gravity, UA 1.015      Protein, UA Negative      Glucose, UA Negative      Ketones, UA Negative      Bilirubin (UA) Negative      Occult Blood UA Negative      Nitrite, UA Negative      Urobilinogen, UA Negative      Leukocytes, UA 2+ (*)     Narrative:     Specimen Source->Urine   URINALYSIS MICROSCOPIC - Abnormal    RBC, UA 2      WBC, UA 23 (*)     Bacteria Few (*)     Squam Epithel, UA 8      Microscopic Comment SEE COMMENT      Narrative:     Specimen Source->Urine   CULTURE, URINE   CBC W/ AUTO DIFFERENTIAL    WBC 6.71      RBC 4.15      Hemoglobin 12.6      Hematocrit 37.7      MCV 91      MCH 30.4      MCHC 33.4      RDW 13.2      Platelets 205      MPV 9.2      Immature Granulocytes 0.3      Gran # (ANC) 3.5      Immature Grans (Abs) 0.02      Lymph # 2.2      Mono # 0.5      Eos # 0.4      Baso # 0.06      nRBC 0      Gran % 51.9      Lymph % 33.2      Mono % 7.9      Eosinophil % 5.8      Basophil % 0.9      Differential Method Automated     TROPONIN I    Troponin I <0.006     B-TYPE NATRIURETIC PEPTIDE    BNP 23     TROPONIN I          Imaging Results              CTA  Head and Neck (xpd) (Final result)  Result time 12/20/24 21:33:37      Final result by Angeline Lopez MD (12/20/24 21:33:37)                   Impression:      Significant artifact limiting assessment particularly of the smaller intracranial arteries distal to the [id number].  No acute intracranial abnormality is noted as imaged    No high-grade stenosis or major vessel occlusion.    Remote lacunar infarct in the left basal ganglia and left periventricular white matter.    Patchy microvascular chronic ischemic changes stable since prior MRI.    Additional nonacute findings as above.      Electronically signed by: Angeline Lopez  Date:    12/20/2024  Time:    21:33               Narrative:    EXAMINATION:  CTA HEAD AND NECK (XPD)    CLINICAL HISTORY:  Dizziness, persistent/recurrent, cardiac or vascular cause suspected;    TECHNIQUE:  Non contrast low dose axial images were obtained through the head. CT angiogram was performed from the level of the ulises to the top of the head following the IV administration of seventy-fivemL of Omnipaque 350.   Sagittal and coronal reconstructions and maximum intensity projection reconstructions were performed. Arterial stenosis percentages are based on NASCET measurement criteria.  Rapid AI software is utilized to evaluate for intracranial hemorrhage and large visual vessel occlusion.    COMPARISON:  CT brain 11/29/2019, MRI brain 03/28/2023 the    FINDINGS:  Significant motion artifact is present particularly through the neck limiting fine detail.    Intracranial Compartment:    There is no extra-axial acute hemorrhage or fluid collections.    Mild patchy low density in the periventricular white matter noted as seen with microvascular chronic ischemic changes.  Remote lacunar infarct in the left corona radiata and basal ganglia noted.  There is no parenchymal mass, acute hemorrhage, edema, or major vascular distribution infarct.  Prominence of the ventricles,  cisterns and sulci is consistent with patient's age and senescent atrophic changes.    Skull/Extracranial Contents (limited evaluation): No visualized fracture.  Mastoid air cells and paranasal sinuses are essentially clear.    Non-Vascular Structures of the Neck/Thoracic Inlet (limited evaluation): No significant nodules or interstitial airspace disease in the lung apices.  Calcified lymph nodes noted in the mediastinum.  Imaged.    Aorta: Normal 3 vessel arch.  Motion artifact limits fine detail.  There is mild to moderate atherosclerosis in the imaged aorta.  There is no convincing significant stenosis involving the imaged great vessels noting atherosclerotic calcifications at the origin of the left subclavian artery contribute to  mild stenosis.    Extracranial carotid circulation: No hemodynamically significant stenosis, aneurysmal dilatation, or dissection noting significant artifact limits evaluation.    Extracranial vertebral circulation: No hemodynamically significant stenosis or dissection.    Intracranial Arteries: No appreciable focal high-grade stenosis, occlusion, or aneurysm.  The smaller vessels is along the more peripheral anterior middle and posterior cerebral arteries are poorly imaged secondary to motion.  No convincing significant stenosis involving the [place] arteries.  Intracranial aneurysm is difficult to reliably exclude.    Venous structures (limited evaluation): Normal as imaged.                                       CT Cervical Spine Without Contrast (Final result)  Result time 12/20/24 21:44:05      Final result by Angeline Lopez MD (12/20/24 21:44:05)                   Impression:      No evidence of acute fracture.  Mild anterolisthesis of C7 relative to T1 may be related to spondylosis.  Correlation is needed with additional dynamic or MRI imaging if clinically warranted for ligamentous injury.    Multilevel spondylosis and stenosis as detailed above at each cervical disc  level.    Atherosclerotic vascular calcifications involving the aorta and imaged great vessels most pronounced at the origin of the left subclavian artery.      Electronically signed by: Angeline Lopez  Date:    12/20/2024  Time:    21:44               Narrative:    EXAMINATION:  CT CERVICAL SPINE WITHOUT CONTRAST    CLINICAL HISTORY:  Neck trauma (Age >= 65y);    TECHNIQUE:  Low dose axial images, sagittal and coronal reformations were performed though the cervical spine.  Contrast was not administered.    COMPARISON:  CTA head neck also obtained same date 12/20/2024    FINDINGS:  Sagittal reformatted images demonstrate mild anterolisthesis of C7 relative to T1 which may be attributed to degenerative changes.  No evidence of acute fractures seen.    C2-C3:There is small central disc protrusion.  There is no canal or foraminal stenosis noting bilateral moderate facet hypertrophic changes.    C3-C4:Small central disc protrusion contacts the ventral cervical spinal cord.  There is no significant canal stenosis.  Facet and uncovertebral joint hypertrophy contribute to severe right neural foraminal stenosis.    C4-C5:There is loss of disc height and disc osteophyte complex formation in the canal.  Uncovertebral joint and facet severe hypertrophy contribute to severe right neural foraminal stenosis.  No significant canal stenosis.  There is mild left neural foraminal stenosis.    C5-C6:There is loss of disc height and circumferential disc osteophyte complex formation.  There is no canal stenosis or significant disc protrusion.  There is severe right and moderate left neural foraminal stenosis related to facet and uncovertebral joint hypertrophy.    C6-C7:Circumferential disc osteophyte complex formation and loss of disc height noted.  There is no significant canal stenosis.  There is severe left and moderate right neural foraminal stenosis related to facet and uncovertebral joint hypertrophy.    C7-T1:Anterolisthesis of  C7 relative to T1 is noted.  There is facet hypertrophy bilaterally.  No bony canal or significant neural foraminal stenosis.    There is around 40-50 % stenosis related to atherosclerotic calcifications at the origin of the left subclavian artery.  Motion artifact on CTA neck limited evaluation.  Mild atherosclerotic calcifications are noted in the imaged aortic arch.    Heterogeneity of the right lobe of the thyroid is consistent with focal nodule is.    The airway is patent and the lung apices are unremarkable.  The visualized portions of the brain demonstrate no significant abnormality.                                       Medications   nitrofurantoin (macrocrystal-monohydrate) 100 MG capsule 100 mg (has no administration in time range)   iohexoL (OMNIPAQUE 350) 350 mg iodine/mL injection (75 mLs Intravenous Given 12/20/24 2027)     Medical Decision Making  Asymptomatic hypertension today, had an episode of dizziness and a fall 2 days ago, she is hypertensive here 190s over 90s otherwise vitals are within normal limits on exam she is neurologically entire, or daughter is at bedside reports from mom is at her baseline.  Differential includes asymptomatic hypertension metabolic or neuroendocrine during shortened intracranial pathology vascular pathology.  Do not think she needs significant workup for this asymptomatic hypertension.  With his dizziness lightheadedness fall I will scan her head, CTA as well to look at the vasculature.  Do not think she needs an MRI at this time.  As she does not have chest pain shortness of breath cough or any other symptoms such as that I do not think she needs a chest x-ray.  As she is asymptomatic for 2 days I think she is reasonable for outpatient follow up after reassuring workup    Amount and/or Complexity of Data Reviewed  Independent Historian: caregiver     Details: Daughter reports she is at her baseline  External Data Reviewed: notes.     Details: 10/28/2018 discharge  summary admitted after chest pain  Labs: ordered.  Radiology: ordered.  ECG/medicine tests: ordered and independent interpretation performed.     Details: EKG on my independent interpretation 84 beats per minute normal axis normal intervals QTC less than 500 no STEMI    Risk  Prescription drug management.               ED Course as of 12/20/24 2220   Fri Dec 20, 2024   2116 I do not appreciate evidence of midline shift on my independent interpretation of head CT [IC]   2212 She reports she wants to go home [IC]   2213 Updated on findings including possible UTI, she does report some mild burning when she pees for the last couple of days, we will treat her with antibiotic, patient and daughter in agreement with the plan they would like to go home, Return precautions/follow up instructions/treatment plan given, expressed agreement and understanding.    [IC]      ED Course User Index  [IC] Brayan Saavedra MD                           Clinical Impression:  Final diagnoses:  [R07.9] Chest pain  [I10] Hypertension, unspecified type (Primary)  [W19.XXXA] Fall, initial encounter  [N39.0, R31.9] Urinary tract infection with hematuria, site unspecified          ED Disposition Condition    Discharge Stable          ED Prescriptions    None       Follow-up Information       Follow up With Specialties Details Why Contact Info Additional Information    Shaun Espinoza MD Family Medicine Schedule an appointment as soon as possible for a visit in 3 days  [address] [facility] [id number] [phone]       [facility] Emergency Medicine Go to  As needed, If symptoms worsen [address] [place] [address] [address] [place] [address] 1st floor             Brayan Saavedra MD  12/20/24 2220

## 2024-12-22 LAB
BACTERIA UR CULT: NORMAL
BACTERIA UR CULT: NORMAL

## 2024-12-23 LAB
OHS QRS DURATION: 72 MS
OHS QTC CALCULATION: 420 MS

## 2025-01-06 ENCOUNTER — OFFICE VISIT (OUTPATIENT)
Dept: PAIN MEDICINE | Facility: CLINIC | Age: 75
End: 2025-01-06
Payer: MEDICARE

## 2025-01-06 VITALS
DIASTOLIC BLOOD PRESSURE: 89 MMHG | SYSTOLIC BLOOD PRESSURE: 159 MMHG | BODY MASS INDEX: 20.54 KG/M2 | HEIGHT: 59 IN | HEART RATE: 96 BPM | WEIGHT: 101.88 LBS

## 2025-01-06 DIAGNOSIS — M54.16 LUMBAR RADICULITIS: ICD-10-CM

## 2025-01-06 DIAGNOSIS — M47.896 OTHER SPONDYLOSIS, LUMBAR REGION: ICD-10-CM

## 2025-01-06 DIAGNOSIS — M54.9 DORSALGIA, UNSPECIFIED: Primary | ICD-10-CM

## 2025-01-06 DIAGNOSIS — M51.369 DEGENERATION OF INTERVERTEBRAL DISC OF LUMBAR REGION, UNSPECIFIED WHETHER PAIN PRESENT: ICD-10-CM

## 2025-01-06 PROCEDURE — 1101F PT FALLS ASSESS-DOCD LE1/YR: CPT | Mod: CPTII,S$GLB,, | Performed by: ANESTHESIOLOGY

## 2025-01-06 PROCEDURE — 99999 PR PBB SHADOW E&M-EST. PATIENT-LVL III: CPT | Mod: PBBFAC,,, | Performed by: ANESTHESIOLOGY

## 2025-01-06 PROCEDURE — 3008F BODY MASS INDEX DOCD: CPT | Mod: CPTII,S$GLB,, | Performed by: ANESTHESIOLOGY

## 2025-01-06 PROCEDURE — 99205 OFFICE O/P NEW HI 60 MIN: CPT | Mod: S$GLB,,, | Performed by: ANESTHESIOLOGY

## 2025-01-06 PROCEDURE — 3079F DIAST BP 80-89 MM HG: CPT | Mod: CPTII,S$GLB,, | Performed by: ANESTHESIOLOGY

## 2025-01-06 PROCEDURE — 1125F AMNT PAIN NOTED PAIN PRSNT: CPT | Mod: CPTII,S$GLB,, | Performed by: ANESTHESIOLOGY

## 2025-01-06 PROCEDURE — 1159F MED LIST DOCD IN RCRD: CPT | Mod: CPTII,S$GLB,, | Performed by: ANESTHESIOLOGY

## 2025-01-06 PROCEDURE — 3077F SYST BP >= 140 MM HG: CPT | Mod: CPTII,S$GLB,, | Performed by: ANESTHESIOLOGY

## 2025-01-06 PROCEDURE — 3288F FALL RISK ASSESSMENT DOCD: CPT | Mod: CPTII,S$GLB,, | Performed by: ANESTHESIOLOGY

## 2025-01-06 PROCEDURE — G2211 COMPLEX E/M VISIT ADD ON: HCPCS | Mod: S$GLB,,, | Performed by: ANESTHESIOLOGY

## 2025-01-06 NOTE — PROGRESS NOTES
This note was completed with dictation software and grammatical errors may exist.    Referring Physician: Shaun Espinoza MD    PCP: Shaun Espinoza MD      CC: low back pain    HPI:   Martina Osullivan is a 74 y.o. female referred to us for lower back pain.  Pain has been present for over 20 years but has worsened over 6 months.  No recent.  She presents with constant aching, throbbing pain over her lower back.  Pain radiates to her hips and buttocks.  Pain worsens standing, walking getting up.  She has tried physical therapy with minimal benefit.  She was recently seen in the ED and x-ray of the lumbar spine was performed.  She was referred to us for further evaluation.  She takes acetaminophen with mild benefits.  She denies any worsening weakness.  No bowel bladder changes.    ROS:  CONSTITUTIONAL: No fevers, chills, night sweats, wt. loss, appetite changes  SKIN: no rashes or itching  ENT: No headaches, head trauma, vision changes, or eye pain  LYMPH NODES: None noticed   CV: No chest pain, palpitations.   RESP: No shortness of breath, dyspnea on exertion, cough, wheezing, or hemoptysis  GI: No nausea, emesis, diarrhea, constipation, melena, hematochezia, pain.    : No dysuria, hematuria, urgency, or frequency   HEME: No easy bruising, bleeding problems  PSYCHIATRIC: No depression, anxiety, psychosis, hallucinations.  NEURO: No seizures, memory loss, dizziness or difficulty sleeping  MSK: +HPI      Past Medical History:   Diagnosis Date    Diabetes mellitus     Hypertension      No past surgical history on file.  Family History   Problem Relation Name Age of Onset    Cancer Mother      Diabetes Father       Social History     Socioeconomic History    Marital status:    Tobacco Use    Smoking status: Never   Substance and Sexual Activity    Alcohol use: No    Sexual activity: Yes     Partners: Male         Medications/Allergies: See med card    Vitals:    01/06/25 1302   BP: (!) 159/89   Pulse: 96   Weight:  "46.2 kg (101 lb 13.6 oz)   Height: 4' 11" (1.499 m)   PainSc:   6   PainLoc: Back         Physical exam:    GENERAL: A and O x3, the patient appears well groomed and is in no acute distress.  Skin: No rashes or obvious lesions  HEENT: normocephalic, atraumatic  CARDIOVASCULAR:  Palpable peripheral pulses  LUNGS: easy work of breathing  ABDOMEN: soft, nontender   UPPER EXTREMITIES: Normal alignment, normal range of motion, no atrophy, no skin changes,  hair growth and nail growth normal and equal bilaterally. No swelling, no tenderness.    LOWER EXTREMITIES:  Normal alignment, normal range of motion, no atrophy, no skin changes,  hair growth and nail growth normal and equal bilaterally. No swelling, no tenderness.    LUMBAR SPINE  Lumbar spine: ROM is limited with flexion extension and oblique extension with moderate increased pain.    Pranav's test causes no increased pain on either side.    Supine straight leg raise is negative bilaterally.    Internal and external rotation of the hip causes no increased pain on either side.  Myofascial exam: No tenderness to palpation across lumbar paraspinous muscles.      MENTAL STATUS: normal orientation, speech, language, and fund of knowledge for social situation.  Emotional state appropriate.    CRANIAL NERVES:  II:  PERRL bilaterally,   III,IV,VI: EOMI.    V:  Facial sensation equal bilaterally  VII:  Facial motor function normal.  VIII:  Hearing equal to finger rub bilaterally  IX/X: Gag normal, palate symmetric  XI:  Shoulder shrug equal, head turn equal  XII:  Tongue midline without fasciculations      MOTOR: Tone and bulk: normal bilateral upper and lower Strength: normal   Delt Bi Tri WE WF     R 5 5 5 5 5 5   L 5 5 5 5 5 5     IP ADD ABD Quad TA Gas HAM  R 5 5 5 5 5 5 5  L 5 5 5 5 5 5 5    SENSATION: Light touch and pinprick intact bilaterally  REFLEXES: normal, symmetric, nonbrisk.  Toes down, no clonus. No hoffmans.  GAIT: normal rise, base, steps, and arm " swing.        Imaging:  Xray L-spine 12/2024  Mild broad dextrocurvature Sharif grade 1 anterolisthesis of L4 on L5. No fractures or bony destructive changes identified. Severe disc height loss at L4-L5 and moderate to severe disc height loss at L5-S1 with lower lumbar predominant facet arthrosis. Atherosclerotic changes. Calcified uterine fibroid/vascular calcifications within the pelvis noted.     Assessment:    Patient presents with lower back pain  1. Dorsalgia, unspecified    2. Other spondylosis, lumbar region    3. Degeneration of intervertebral disc of lumbar region, unspecified whether pain present    4. Lumbar radiculitis          Plan:  I have stressed the importance of physical activity and exercise to improve overall health  Reviewed pertinent imaging and records with patient  She has a new MRI of the lumbar spine as she has not improved with conservative care.  We will contact patient with MRI results and make further recommendations at that time.  Thank you for referring this interesting patient, and I look forward to continuing to collaborate in her care.

## 2025-01-14 ENCOUNTER — TELEPHONE (OUTPATIENT)
Dept: PAIN MEDICINE | Facility: CLINIC | Age: 75
End: 2025-01-14
Payer: MEDICARE

## 2025-01-14 ENCOUNTER — HOSPITAL ENCOUNTER (OUTPATIENT)
Dept: RADIOLOGY | Facility: HOSPITAL | Age: 75
Discharge: HOME | End: 2025-01-14
Attending: ANESTHESIOLOGY
Payer: MEDICARE

## 2025-01-14 DIAGNOSIS — M80.08XA AGE-RELATED OSTEOPOROSIS WITH CURRENT PATHOLOGICAL FRACTURE, VERTEBRA(E), INITIAL ENCOUNTER FOR FRACTURE: ICD-10-CM

## 2025-01-14 DIAGNOSIS — S22.080A COMPRESSION FRACTURE OF T12 VERTEBRA, INITIAL ENCOUNTER: Primary | ICD-10-CM

## 2025-01-14 DIAGNOSIS — M54.9 DORSALGIA, UNSPECIFIED: ICD-10-CM

## 2025-01-14 PROCEDURE — 72148 MRI LUMBAR SPINE W/O DYE: CPT | Mod: 26,,, | Performed by: RADIOLOGY

## 2025-01-14 PROCEDURE — 72148 MRI LUMBAR SPINE W/O DYE: CPT | Mod: TC

## 2025-01-14 NOTE — TELEPHONE ENCOUNTER
MRI reviewed.  She has fracture at T12.   We can do a kypho if she desires.  (Patient is Citizen of [place] speaking so may need to discuss this with her daughte

## 2025-01-24 ENCOUNTER — TELEPHONE (OUTPATIENT)
Dept: PAIN MEDICINE | Facility: CLINIC | Age: 75
End: 2025-01-24
Payer: MEDICARE

## 2025-01-24 DIAGNOSIS — S22.080A COMPRESSION FRACTURE OF T12 VERTEBRA, INITIAL ENCOUNTER: Primary | ICD-10-CM

## 2025-01-24 NOTE — TELEPHONE ENCOUNTER
I will call pt daughter to inform her we need the dexa scan done to get authorization for the procedure.   ----- Message -----   From: Addy Bazan MD   Sent: 1/24/2025   9:21 AM CST   To: Daria Manzano LPN   Subject: RE: sx 01/30/2025                                Ok.  Dexa scan ordered   ----- Message -----   From: Daria Manzano LPN   Sent: 1/24/2025   9:19 AM CST   To: Addy Bazan MD   Subject: FW: sx 01/30/2025                                We will likely need a dexa scan to get the diagnosis unless she has seen another provider to provider proof of osteoenia or osteoporosis.   ----- Message -----   From: Roro Hernandez   Sent: 1/24/2025   9:16 AM CST   To: Daria Manzano LPN   Subject: RE: sx 01/30/2025                                They a need diagnosis of Osteoporosis Or Osteopenia or Provide Dexa Scan Report   ----- Message -----   From: Daria Manzano LPN   Sent: 1/24/2025   9:11 AM CST   To: Roro Hernandez   Subject: FW: sx 01/30/2025                                Do we know if we need to order a DEXA scan.   ----- Message -----   From: Addy Bazan MD   Sent: 1/24/2025   8:59 AM CST   To: Daria Manzano LPN   Subject: RE: sx 01/30/2025                                Do we need to get a DEXA scan?   ----- Message -----   From: Daria Manzano LPN   Sent: 1/24/2025   8:29 AM CST   To: Addy Bazan MD   Subject: FW: sx 01/30/2025                                Please advise   ----- Message -----   From: Roro Hernandez   Sent: 1/24/2025   8:24 AM CST   To: Beni Miranda   Subject: sx 01/30/2025                                    Good Morning     I am reaching out about this pt sx on 01/30/2025 , per the pt insurance they need more information regarding this request . Copy of request for additional information is scanned in .  A P2P may also be requested . Please keep me updated once this is complete, so I can F/U on this case.     Cohere Request     · Documentation  of diagnosis of Osteoporosis or Osteopenia and/or provide Dexa Scan Report · Documentation of worsening pain (VAS >5) OR stable to improved pain that includes 2 or more of the following: progression of vertebral body height loss, >25% vertebral body height reduction, kyphotic deformity and/or severe impact of VCF on daily function (Mitchell Vidal Disability Questionnaire (RDQ) · A trial of conservative therapy - including dates and duration · Physical therapy and duration · Steroid/Facet/MBB Injections- Dates and location/level of injection · Surgical plan of care

## 2025-01-27 ENCOUNTER — ANESTHESIA EVENT (OUTPATIENT)
Dept: SURGERY | Facility: HOSPITAL | Age: 75
End: 2025-01-27
Payer: MEDICARE

## 2025-01-27 NOTE — TELEPHONE ENCOUNTER
Spoke with daughter and informed her to get a dexa scan  and RS to 02/06 informed medtronic reps as well.

## 2025-01-30 ENCOUNTER — ANESTHESIA (OUTPATIENT)
Dept: SURGERY | Facility: HOSPITAL | Age: 75
End: 2025-01-30
Payer: MEDICARE

## 2025-01-31 NOTE — DISCHARGE INSTRUCTIONS
Post Procedure Instructions:    These instructions are for general purpose only.  For specific Post Procedure Instructions, please follow the written instructions given to you following the procedure.    You should feel immediate relief of pain or pain may be reduced or eliminated within 2 days  If you received sedation during the procedure, do not drive, operate machinery or make any important decisions for the next 24-48 hrs.  If you have stopped taking aspirin prior to the procedure, please restart it tomorrow. You may restart Plavix or Coumadin the day after your procedure.  Do not shower or soak in a bath tub for 2 days after the procedure.  Remove all small bandages on your incision 24-48 hrs after the procedure.    No heavy lifting or strenuous activity until you are seen and cleared by the DR.  You may use ice or heat    Please seek medical help immediately if any of the following symptoms occur:  Severe increase in your usual pain or appearance of new pain  Prolonged or increasing weakness or numbness in the legs or arms  Drainage, redness, active bleeding or increased swelling at the injection site  Temperature over 100F  Headache that increases when your head is upright and decreases when you lie flat  Shortness of breath, chest pain   DO NOT just leave a message on the phone or portal if any of these symptoms occur    Your follow up appointment will be determined  AFTER the procedure.

## 2025-02-04 ENCOUNTER — HOSPITAL ENCOUNTER (OUTPATIENT)
Dept: RADIOLOGY | Facility: HOSPITAL | Age: 75
Discharge: HOME | End: 2025-02-04
Attending: ANESTHESIOLOGY
Payer: MEDICARE

## 2025-02-04 DIAGNOSIS — S22.080A COMPRESSION FRACTURE OF T12 VERTEBRA, INITIAL ENCOUNTER: ICD-10-CM

## 2025-02-04 PROCEDURE — 77080 DXA BONE DENSITY AXIAL: CPT | Mod: TC,PO

## 2025-02-04 PROCEDURE — 77080 DXA BONE DENSITY AXIAL: CPT | Mod: 26,,, | Performed by: RADIOLOGY

## 2025-02-05 RX ORDER — OXYCODONE HYDROCHLORIDE 5 MG/1
5 TABLET ORAL
Status: CANCELLED | OUTPATIENT
Start: 2025-02-05

## 2025-02-05 RX ORDER — ONDANSETRON HYDROCHLORIDE 2 MG/ML
4 INJECTION, SOLUTION INTRAVENOUS DAILY PRN
Status: CANCELLED | OUTPATIENT
Start: 2025-02-05

## 2025-02-05 RX ORDER — SODIUM CHLORIDE 0.9 % (FLUSH) 0.9 %
3 SYRINGE (ML) INJECTION
Status: CANCELLED | OUTPATIENT
Start: 2025-02-05

## 2025-02-05 RX ORDER — FENTANYL CITRATE 50 UG/ML
25 INJECTION, SOLUTION INTRAMUSCULAR; INTRAVENOUS EVERY 5 MIN PRN
Status: CANCELLED | OUTPATIENT
Start: 2025-02-05

## 2025-02-06 ENCOUNTER — HOSPITAL ENCOUNTER (OUTPATIENT)
Facility: HOSPITAL | Age: 75
Discharge: HOME | End: 2025-02-06
Attending: ANESTHESIOLOGY | Admitting: ANESTHESIOLOGY
Payer: MEDICARE

## 2025-02-06 DIAGNOSIS — M80.08XA AGE-RELATED OSTEOPOROSIS WITH CURRENT PATHOLOGICAL FRACTURE, VERTEBRA(E), INITIAL ENCOUNTER FOR FRACTURE: ICD-10-CM

## 2025-02-06 LAB — POCT GLUCOSE: 110 MG/DL (ref 70–110)

## 2025-02-06 PROCEDURE — C1726 CATH, BAL DIL, NON-VASCULAR: HCPCS | Performed by: ANESTHESIOLOGY

## 2025-02-06 PROCEDURE — 63600175 PHARM REV CODE 636 W HCPCS: Performed by: ANESTHESIOLOGY

## 2025-02-06 PROCEDURE — C1713 ANCHOR/SCREW BN/BN,TIS/BN: HCPCS | Performed by: ANESTHESIOLOGY

## 2025-02-06 PROCEDURE — 37000009 HC ANESTHESIA EA ADD 15 MINS: Performed by: ANESTHESIOLOGY

## 2025-02-06 PROCEDURE — 36000706: Performed by: ANESTHESIOLOGY

## 2025-02-06 PROCEDURE — 71000033 HC RECOVERY, INTIAL HOUR: Performed by: ANESTHESIOLOGY

## 2025-02-06 PROCEDURE — 36000707: Performed by: ANESTHESIOLOGY

## 2025-02-06 PROCEDURE — C1894 INTRO/SHEATH, NON-LASER: HCPCS | Performed by: ANESTHESIOLOGY

## 2025-02-06 PROCEDURE — D9220A PRA ANESTHESIA: Mod: GZ,ANES,, | Performed by: ANESTHESIOLOGY

## 2025-02-06 PROCEDURE — D9220A PRA ANESTHESIA: Mod: GZ,CRNA,, | Performed by: NURSE ANESTHETIST, CERTIFIED REGISTERED

## 2025-02-06 PROCEDURE — 27201423 OPTIME MED/SURG SUP & DEVICES STERILE SUPPLY: Performed by: ANESTHESIOLOGY

## 2025-02-06 PROCEDURE — 25000003 PHARM REV CODE 250: Performed by: ANESTHESIOLOGY

## 2025-02-06 PROCEDURE — 22513 PERQ VERTEBRAL AUGMENTATION: CPT | Mod: ,,, | Performed by: ANESTHESIOLOGY

## 2025-02-06 PROCEDURE — 63600175 PHARM REV CODE 636 W HCPCS: Performed by: NURSE ANESTHETIST, CERTIFIED REGISTERED

## 2025-02-06 PROCEDURE — 37000008 HC ANESTHESIA 1ST 15 MINUTES: Performed by: ANESTHESIOLOGY

## 2025-02-06 DEVICE — CEMENT KYPHOPLASTY HI VISC: Type: IMPLANTABLE DEVICE | Site: VERTEBRAE | Status: FUNCTIONAL

## 2025-02-06 RX ORDER — LIDOCAINE HYDROCHLORIDE 20 MG/ML
INJECTION INTRAVENOUS
Status: DISCONTINUED | OUTPATIENT
Start: 2025-02-06 | End: 2025-02-06

## 2025-02-06 RX ORDER — SODIUM CHLORIDE, SODIUM LACTATE, POTASSIUM CHLORIDE, CALCIUM CHLORIDE 600; 310; 30; 20 MG/100ML; MG/100ML; MG/100ML; MG/100ML
INJECTION, SOLUTION INTRAVENOUS CONTINUOUS
Status: DISCONTINUED | OUTPATIENT
Start: 2025-02-06 | End: 2025-02-06 | Stop reason: HOSPADM

## 2025-02-06 RX ORDER — CEFAZOLIN SODIUM 1 G/3ML
2 INJECTION, POWDER, FOR SOLUTION INTRAMUSCULAR; INTRAVENOUS
Status: COMPLETED | OUTPATIENT
Start: 2025-02-06 | End: 2025-02-06

## 2025-02-06 RX ORDER — MIDAZOLAM HYDROCHLORIDE 1 MG/ML
INJECTION INTRAMUSCULAR; INTRAVENOUS
Status: DISCONTINUED | OUTPATIENT
Start: 2025-02-06 | End: 2025-02-06

## 2025-02-06 RX ORDER — LIDOCAINE HYDROCHLORIDE 10 MG/ML
1 INJECTION, SOLUTION EPIDURAL; INFILTRATION; INTRACAUDAL; PERINEURAL ONCE
Status: DISCONTINUED | OUTPATIENT
Start: 2025-02-06 | End: 2025-02-06

## 2025-02-06 RX ORDER — PROPOFOL 10 MG/ML
VIAL (ML) INTRAVENOUS
Status: DISCONTINUED | OUTPATIENT
Start: 2025-02-06 | End: 2025-02-06

## 2025-02-06 RX ORDER — ACETAMINOPHEN 10 MG/ML
INJECTION, SOLUTION INTRAVENOUS
Status: DISCONTINUED | OUTPATIENT
Start: 2025-02-06 | End: 2025-02-06

## 2025-02-06 RX ORDER — PROPOFOL 10 MG/ML
VIAL (ML) INTRAVENOUS CONTINUOUS PRN
Status: DISCONTINUED | OUTPATIENT
Start: 2025-02-06 | End: 2025-02-06

## 2025-02-06 RX ORDER — FENTANYL CITRATE 50 UG/ML
INJECTION, SOLUTION INTRAMUSCULAR; INTRAVENOUS
Status: DISCONTINUED | OUTPATIENT
Start: 2025-02-06 | End: 2025-02-06

## 2025-02-06 RX ORDER — ONDANSETRON HYDROCHLORIDE 2 MG/ML
INJECTION, SOLUTION INTRAVENOUS
Status: DISCONTINUED | OUTPATIENT
Start: 2025-02-06 | End: 2025-02-06

## 2025-02-06 RX ORDER — BUPIVACAINE HYDROCHLORIDE AND EPINEPHRINE 2.5; 5 MG/ML; UG/ML
INJECTION, SOLUTION EPIDURAL; INFILTRATION; INTRACAUDAL; PERINEURAL
Status: DISCONTINUED | OUTPATIENT
Start: 2025-02-06 | End: 2025-02-06 | Stop reason: HOSPADM

## 2025-02-06 RX ORDER — DEXAMETHASONE SODIUM PHOSPHATE 4 MG/ML
INJECTION, SOLUTION INTRA-ARTICULAR; INTRALESIONAL; INTRAMUSCULAR; INTRAVENOUS; SOFT TISSUE
Status: DISCONTINUED | OUTPATIENT
Start: 2025-02-06 | End: 2025-02-06

## 2025-02-06 RX ORDER — LIDOCAINE HYDROCHLORIDE 10 MG/ML
1 INJECTION, SOLUTION EPIDURAL; INFILTRATION; INTRACAUDAL; PERINEURAL ONCE
Status: DISCONTINUED | OUTPATIENT
Start: 2025-02-06 | End: 2025-02-06 | Stop reason: HOSPADM

## 2025-02-06 RX ORDER — LIDOCAINE HYDROCHLORIDE 10 MG/ML
INJECTION, SOLUTION EPIDURAL; INFILTRATION; INTRACAUDAL; PERINEURAL
Status: DISCONTINUED | OUTPATIENT
Start: 2025-02-06 | End: 2025-02-06 | Stop reason: HOSPADM

## 2025-02-06 RX ADMIN — DEXAMETHASONE SODIUM PHOSPHATE 4 MG: 4 INJECTION, SOLUTION INTRAMUSCULAR; INTRAVENOUS at 12:02

## 2025-02-06 RX ADMIN — FENTANYL CITRATE 25 MCG: 50 INJECTION, SOLUTION INTRAMUSCULAR; INTRAVENOUS at 12:02

## 2025-02-06 RX ADMIN — MIDAZOLAM 1 MG: 1 INJECTION INTRAMUSCULAR; INTRAVENOUS at 12:02

## 2025-02-06 RX ADMIN — SODIUM CHLORIDE, POTASSIUM CHLORIDE, SODIUM LACTATE AND CALCIUM CHLORIDE: 600; 310; 30; 20 INJECTION, SOLUTION INTRAVENOUS at 11:02

## 2025-02-06 RX ADMIN — LIDOCAINE HYDROCHLORIDE 50 MG: 20 INJECTION, SOLUTION INTRAVENOUS at 12:02

## 2025-02-06 RX ADMIN — PROPOFOL 50 MCG/KG/MIN: 10 INJECTION, EMULSION INTRAVENOUS at 12:02

## 2025-02-06 RX ADMIN — CEFAZOLIN 2 G: 1 INJECTION, POWDER, FOR SOLUTION INTRAVENOUS at 12:02

## 2025-02-06 RX ADMIN — MIDAZOLAM 2 MG: 1 INJECTION INTRAMUSCULAR; INTRAVENOUS at 12:02

## 2025-02-06 RX ADMIN — PROPOFOL 30 MG: 10 INJECTION, EMULSION INTRAVENOUS at 12:02

## 2025-02-06 RX ADMIN — ONDANSETRON 4 MG: 2 INJECTION, SOLUTION INTRAMUSCULAR; INTRAVENOUS at 12:02

## 2025-02-06 RX ADMIN — ACETAMINOPHEN 690 MG: 10 INJECTION INTRAVENOUS at 12:02

## 2025-02-06 NOTE — H&P
"CC: LOW BACK PAIN     HPI: The patient is a 74 y.o. female with a history of low back pain here for kypho. There are no major changes in history and physical from 1/6/25 by myself.    Past Medical History:   Diagnosis Date    Diabetes mellitus     Hypertension        Past Surgical History:   Procedure Laterality Date    no family of anes problems         Family History   Problem Relation Name Age of Onset    Cancer Mother      Diabetes Father         Social History     Socioeconomic History    Marital status:    Tobacco Use    Smoking status: Never   Substance and Sexual Activity    Alcohol use: No    Sexual activity: Yes     Partners: Male       No current facility-administered medications for this encounter.       Review of patient's allergies indicates:   Allergen Reactions    Beef containing products        Vitals:    01/31/25 0807   Weight: 46.2 kg (101 lb 13.6 oz)   Height: 4' 11" (1.499 m)       REVIEW OF SYSTEMS:     GENERAL: No weight loss, malaise or fevers.  HEENT:  No recent changes in vision or hearing  NECK: Negative for lumps, no difficulty with swallowing.  RESPIRATORY: Negative for cough, wheezing or shortness of breath, patient denies any recent URI.  CARDIOVASCULAR: Negative for chest pain, leg swelling or palpitations.  GI: Negative for abdominal discomfort, blood in stools or black stools or change in bowel habits.  MUSCULOSKELETAL: See HPI.  SKIN: Negative for lesions, rash, and itching.  PSYCH: No suicidal or homicidal ideations, no current mood disturbances.  HEMATOLOGY/LYMPHOLOGY: Negative for prolonged bleeding, bruising easily or swollen nodes. Patient is not currently taking any anti-coagulants  ENDO: No history of diabetes or thyroid dysfunction  NEURO: No history of syncope, paralysis, seizures or tremors.All other reviewed and negative other than HPI.    Physical exam:  Gen: A and O x3, pleasant, well-groomed  Skin: No rashes or obvious lesions  HEENT: PERRLA, no obvious " deformities on ears or in canals. No thyroid masses, trachea midline, no palpable lymph nodes in neck, axilla.  CVS: Regular rate and rhythm, normal S1 and S2, no murmurs.  Resp: Clear to auscultation bilaterally.  Abdomen: Soft, NT/ND, normal bowel sounds present.  Musculoskeletal/Neuro: Moving all extremities    Assessment:  Age-related osteoporosis with current pathological fracture, vertebra(e), initial encounter for fracture    Other orders  -     Place in Outpatient; Standing  -     Vital signs; Standing  -     LIDOcaine (PF) 10 mg/ml (1%) injection 10 mg  -     Verify informed consent; Standing  -     Notify physician ; Standing  -     Notify physician ; Standing  -     Notify physician (specify); Standing  -     Diet NPO; Standing  -     lactated ringers infusion  -     IP VTE LOW RISK PATIENT; Standing  -     ceFAZolin injection 2 g  -     Vital signs; Standing  -     Insert peripheral IV; Standing  -     Use 1% lidocaine at IV site; Standing  -     electrolyte-S (ISOLYTE)  -     Notify Anesthesiologist if Patient on Home Insulin Pump; Standing  -     LIDOcaine (PF) 10 mg/ml (1%) injection 10 mg  -     Admit to Phase 1 PACU, transfer to Phase 2 per protocol when indicated ; Standing  -     Vital signs; Standing  -     fentaNYL 50 mcg/mL injection 25 mcg  -     oxyCODONE immediate release tablet 5 mg  -     ondansetron injection 4 mg  -     Intake and output Per protocol; Standing  -     Apply warming blanket; Standing  -     Notify Physician - Potential Need of Opioid Reversal; Standing  -     sodium chloride 0.9% flush 3 mL          PLAN: Kypho      This patient has been cleared for surgery in an ambulatory surgical facility    ASA 3,  Mallampatti Score 3  No history of anesthetic complications  Plan for RN IV sedation

## 2025-02-06 NOTE — DISCHARGE SUMMARY
[facility] - Periop Services  Discharge Note  Short Stay    Procedure(s) (LRB):  KYPHOPLASTY, SPINE, THORACIC t12 ( lilliana and stephany med[employer] aware please cll pt daughter as pt is English speaking only) (N/A)      OUTCOME: Patient tolerated treatment/procedure well without complication and is now ready for discharge.    DISPOSITION: Home or Self Care    FINAL DIAGNOSIS:  <principal problem not specified>    FOLLOWUP: In clinic    DISCHARGE INSTRUCTIONS:    Discharge Procedure Orders   Notify your health care provider if you experience any of the following:  temperature >100.4     Notify your health care provider if you experience any of the following:  severe uncontrolled pain     Notify your health care provider if you experience any of the following:  redness, tenderness, or signs of infection (pain, swelling, redness, odor or green/yellow discharge around incision site)     Activity as tolerated         Clinical Reference Documents Added to Patient Instructions         Document    MODERATE SEDATION IN ADULTS DISCHARGE INSTRUCTIONS ([place]ian)            TIME SPENT ON DISCHARGE: 30 minutes

## 2025-02-06 NOTE — OP NOTE
DATE: 2/6/2025      PROCEDURE PERFORMED: Transpedicular Kyphoplasty at the T12 level(s)                                                                               PREPROCEDURE DIAGNOSIS:                                                      1.  Age related osteoporosis with current pathological fracture, T12 level(s), initial encounter       2.  Pain at these levels of compression fracture.                            3.  No myelopathy or retropulsed fragments.                                                                                                             POSTPROCEDURE DIAGNOSIS:   Same    SURGEON:  Addy Bazan MD     Assistant:  None                                                                                                                                                                               Anesthesia: Local MAC per Anesthesia                                                                                                              LOCAL ANESTHETIC USED:  Lidocaine 1% at each side of vertebra, 5 ml at each level                                                                               ESTIMATED BLOOD LOSS: 10ml                                                                                                                        COMPLICATIONS: None                                                                                                                                 BONE MARROW BIOPSY:  N/A                                                                               TECHNIQUE: A time out was taken to identify patient, procedure and side, and allergies were reviewed. With the patient lying in the prone position and after receiving the intravenous sedation, the area was prepped and draped using the usual sterile fashion, using ChloraPrep and a body fenestrated drape. The area of the compression fracture(s) was determined under fluoroscopic guidance using the AP and lateral views.   Local anesthetic was given with a 25-gauge 1.5 inch needle.  That was followed with completing the local anesthetic injection with a 3.5inch 22-gauge spinal needle going down all the way to the periosteum of the desired pedicle. A 2-mm longitudinal incision was made around the 22-gauge spinal needle to allow introduction of the 10 gauge trocar and cannula to the vertebral body.  Through a transpedicular approach, the trocar and cannula were introduced and advanced slowly.  Once in the posterior one-third of the vertebral body, a drill was placed and advanced to the anterior one-third of the vertebral body. This was performed Left at each level.  Balloons were then placed in each trochar and inflated under live fluoroscopy until noted to be filling the vertebral body, no height change noted, no movement of the end plates noted. The methylmethacrylate resin was mixed and approximately 3ml was injected. Minimal escape was observed while introducing the cement and this was done under live fluoroscopy.  The cannula was applied to each trocar under live fluoroscopy as well.  Each trocar and cannula was removed under live fluoroscopy in a very slow fashion to observe the contrast-impregnated cement. After the trocars were removed, the sites were cleaned and dried. Dermabond was then used to close the incisions.                                                                                The patient layed supine for 60min after the procedure. The patient was monitored after the procedure with no change in neuromuscular status. The patient was given post-procedure and discharge instructions at home.  Instruction regarding bandage were given.  The patient was advised to call if developing any severe pain neurologic changes. Otherwise the patient will follow up with us in 1 week at our clinic.

## 2025-02-06 NOTE — ANESTHESIA PREPROCEDURE EVALUATION
02/06/2025  Martina Osullivan is a 74 y.o., female.      Pre-op Assessment          Review of Systems  Cardiovascular:     Hypertension      Angina             Cardiovascular Symptoms: Angina                       Hypertension         Endocrine:  Diabetes    Diabetes                          Physical Exam  General: Well nourished        Anesthesia Plan  Type of Anesthesia, risks & benefits discussed:    Anesthesia Type: MAC  Intra-op Monitoring Plan: Standard ASA Monitors  Post Op Pain Control Plan: multimodal analgesia and IV/PO Opioids PRN  Induction:  IV  Informed Consent: Informed consent signed with the Patient and all parties understand the risks and agree with anesthesia plan.  All questions answered.   ASA Score: 2  Day of Surgery Review of History & Physical: H&P Update referred to the surgeon/provider.    Ready For Surgery From Anesthesia Perspective.     .

## 2025-02-06 NOTE — PLAN OF CARE
Tolerated procedure well. Denies pain at this time. Transferred out of facility via wheelchair to daughter without incident. Discharge instructions in hand upon departure.

## 2025-02-06 NOTE — TRANSFER OF CARE
"Anesthesia Transfer of Care Note    Patient: Martina Osullivan    Procedure(s) Performed: Procedure(s) (LRB):  KYPHOPLASTY, SPINE, THORACIC t12 ( paty medkristopher aware please cll pt daughter as pt is [place] speaking only) (N/A)    Patient location: PACU    Anesthesia Type: MAC    Transport from OR: Transported from OR on room air with adequate spontaneous ventilation    Post pain: adequate analgesia    Post assessment: no apparent anesthetic complications    Post vital signs: stable    Level of consciousness: awake    Nausea/Vomiting: no nausea/vomiting    Complications: none    Transfer of care protocol was followed      Last vitals: Visit Vitals  BP (!) 195/98 (BP Location: Right arm, Patient Position: Sitting)   Pulse 97   Temp 36.8 °C (98.2 °F) (Skin)   Resp 20   Ht 4' 11" (1.499 m)   Wt 46.2 kg (101 lb 13.6 oz)   SpO2 97%   Breastfeeding No   BMI 20.57 kg/m²     "

## 2025-02-07 VITALS
HEART RATE: 97 BPM | WEIGHT: 101.88 LBS | TEMPERATURE: 98 F | HEIGHT: 59 IN | SYSTOLIC BLOOD PRESSURE: 132 MMHG | RESPIRATION RATE: 18 BRPM | OXYGEN SATURATION: 96 % | BODY MASS INDEX: 20.54 KG/M2 | DIASTOLIC BLOOD PRESSURE: 67 MMHG

## 2025-02-07 NOTE — ANESTHESIA POSTPROCEDURE EVALUATION
Anesthesia Post Evaluation    Patient: Martina Osullivan    Procedure(s) Performed: Procedure(s) (LRB):  KYPHOPLASTY, SPINE, THORACIC (N/A)    Final Anesthesia Type: MAC      Patient location during evaluation: PACU  Patient participation: Yes- Able to Participate  Level of consciousness: awake and alert  Post-procedure vital signs: reviewed and stable  Pain management: adequate  Airway patency: patent    PONV status at discharge: No PONV  Anesthetic complications: no      Cardiovascular status: blood pressure returned to baseline  Respiratory status: unassisted and room air  Hydration status: euvolemic  Follow-up not needed.              Vitals Value Taken Time   /67 02/06/25 1317   Temp 36.6 °C (97.9 °F) 02/06/25 1250   Pulse 97 02/06/25 1320   Resp 18 02/06/25 1300   SpO2 96 % 02/06/25 1320         Event Time   Out of Recovery 13:35:00         Pain/Yesy Score: Yesy Score: 10 (2/6/2025 12:53 PM)  Modified Yesy Score: 20 (2/6/2025 12:53 PM)

## 2025-02-14 ENCOUNTER — OFFICE VISIT (OUTPATIENT)
Dept: PAIN MEDICINE | Facility: CLINIC | Age: 75
End: 2025-02-14
Payer: MEDICARE

## 2025-02-14 ENCOUNTER — TELEPHONE (OUTPATIENT)
Dept: PAIN MEDICINE | Facility: CLINIC | Age: 75
End: 2025-02-14

## 2025-02-14 VITALS — DIASTOLIC BLOOD PRESSURE: 82 MMHG | SYSTOLIC BLOOD PRESSURE: 143 MMHG

## 2025-02-14 DIAGNOSIS — M47.896 OTHER SPONDYLOSIS, LUMBAR REGION: ICD-10-CM

## 2025-02-14 DIAGNOSIS — S22.080A COMPRESSION FRACTURE OF T12 VERTEBRA, INITIAL ENCOUNTER: ICD-10-CM

## 2025-02-14 DIAGNOSIS — M54.16 LUMBAR RADICULITIS: Primary | ICD-10-CM

## 2025-02-14 PROCEDURE — 99999 PR PBB SHADOW E&M-EST. PATIENT-LVL I: CPT | Mod: PBBFAC,,, | Performed by: ANESTHESIOLOGY

## 2025-02-14 NOTE — PROGRESS NOTES
This note was completed with dictation software and grammatical errors may exist.    Referring Physician: No ref. provider found    PCP: Shaun Espinoza MD      CC: low back pain and right leg pain    HPI:   Martina Osullivan is a 74 y.o. female referred to us for lower back pain.  Pain has been present for over 20 years but has worsened over 6 months.  No recent.  She presents with constant aching, throbbing pain over her lower back.  Pain radiates to her hips and buttocks.  Pain worsens standing, walking getting up.  She has tried physical therapy with minimal benefit.  MRI of lumbar spine showed a T12 compression fracture.  She underwent a T12 kyphoplasty procedure February 6, 2025.  She states mid to lower back pain is improved but she continues to have low back with pain radiating down her right leg currently.  She has tried physical therapy with minimal benefit.  She denies any worsening weakness.  No bowel bladder changes.    ROS:  CONSTITUTIONAL: No fevers, chills, night sweats, wt. loss, appetite changes  SKIN: no rashes or itching  ENT: No headaches, head trauma, vision changes, or eye pain  LYMPH NODES: None noticed   CV: No chest pain, palpitations.   RESP: No shortness of breath, dyspnea on exertion, cough, wheezing, or hemoptysis  GI: No nausea, emesis, diarrhea, constipation, melena, hematochezia, pain.    : No dysuria, hematuria, urgency, or frequency   HEME: No easy bruising, bleeding problems  PSYCHIATRIC: No depression, anxiety, psychosis, hallucinations.  NEURO: No seizures, memory loss, dizziness or difficulty sleeping  MSK: +HPI      Past Medical History:   Diagnosis Date    Diabetes mellitus     Hypertension      Past Surgical History:   Procedure Laterality Date    KYPHOPLASTY, SPINE, THORACIC N/A 2/6/2025    Procedure: KYPHOPLASTY, SPINE, THORACIC;  Surgeon: Addy Bazan MD;  Location: [facility];  Service: Pain Management;  Laterality: N/A;    no family of anes problems       Family History    Problem Relation Name Age of Onset    Cancer Mother      Diabetes Father       Social History     Socioeconomic History    Marital status:    Tobacco Use    Smoking status: Never   Substance and Sexual Activity    Alcohol use: No    Sexual activity: Yes     Partners: Male         Medications/Allergies: See med card    Vitals:    02/14/25 0949   BP: (!) 143/82   PainSc: 0-No pain         Physical exam:    GENERAL: A and O x3, the patient appears well groomed and is in no acute distress.  Skin: No rashes or obvious lesions  HEENT: normocephalic, atraumatic  CARDIOVASCULAR:  Palpable peripheral pulses  LUNGS: easy work of breathing  ABDOMEN: soft, nontender   UPPER EXTREMITIES: Normal alignment, normal range of motion, no atrophy, no skin changes,  hair growth and nail growth normal and equal bilaterally. No swelling, no tenderness.    LOWER EXTREMITIES:  Normal alignment, normal range of motion, no atrophy, no skin changes,  hair growth and nail growth normal and equal bilaterally. No swelling, no tenderness.    LUMBAR SPINE  Lumbar spine: ROM is limited with flexion extension and oblique extension with moderate increased pain.    Pranav's test causes no increased pain on either side.    Supine straight leg raise is negative bilaterally.    Internal and external rotation of the hip causes no increased pain on either side.  Myofascial exam: No tenderness to palpation across lumbar paraspinous muscles.      MENTAL STATUS: normal orientation, speech, language, and fund of knowledge for social situation.  Emotional state appropriate.    CRANIAL NERVES:  II:  PERRL bilaterally,   III,IV,VI: EOMI.    V:  Facial sensation equal bilaterally  VII:  Facial motor function normal.  VIII:  Hearing equal to finger rub bilaterally  IX/X: Gag normal, palate symmetric  XI:  Shoulder shrug equal, head turn equal  XII:  Tongue midline without fasciculations      MOTOR: Tone and bulk: normal bilateral upper and lower Strength:  normal   Delt Bi Tri WE WF     R 5 5 5 5 5 5   L 5 5 5 5 5 5     IP ADD ABD Quad TA Gas HAM  R 5 5 5 5 5 5 5  L 5 5 5 5 5 5 5    SENSATION: Light touch and pinprick intact bilaterally  REFLEXES: normal, symmetric, nonbrisk.  Toes down, no clonus. No hoffmans.  GAIT: normal rise, base, steps, and arm swing.        Imaging:  Xray L-spine 12/2024  Mild broad dextrocurvature Sharif grade 1 anterolisthesis of L4 on L5. No fractures or bony destructive changes identified. Severe disc height loss at L4-L5 and moderate to severe disc height loss at L5-S1 with lower lumbar predominant facet arthrosis. Atherosclerotic changes. Calcified uterine fibroid/vascular calcifications within the pelvis noted.     MRI lumbar spine 1/2025  L1-L2: Mild disc space narrowing.  Mild circumferential disc bulge with small superimposed central disc protrusion, which mildly effaces the ventral thecal sac.  Mild bilateral facet arthropathy.   No significant neural foraminal or spinal canal stenosis.     L2-L3: Mild disc space narrowing.  Mild circumferential disc bulge with small right central/subarticular disc protrusion.  Mild bilateral facet arthropathy with right facet effusion.  Ligamentum flavum thickening.  Mild bilateral neural foraminal stenosis.  Mild spinal canal and right lateral recess stenosis.     L3-L4: Mild disc space narrowing.  Circumferential disc bulge.  Mild bilateral facet arthropathy with right facet effusion.  Ligamentum flavum thickening.  Moderate left and mild right neural foraminal stenosis.  Mild spinal canal stenosis.  Moderate to severe left and mild right lateral recess stenosis.     L4-L5: Anterolisthesis with uncovering the disc.  Severe disc space narrowing.  Prominent circumferential disc bulge.  Severe bilateral facet arthropathy with right facet effusion.  Ligamentum flavum thickening.  Severe left and moderate right neural foraminal stenosis.  Severe spinal canal stenosis.     L5-S1: Circumferential disc  bulge with small superimposed right central disc protrusion through an annular fissure.  Moderate bilateral facet arthropathy with left facet effusion.  Ligamentum flavum thickening.  Mild-to-moderate left and mild right neural foraminal stenosis.  Mild spinal canal stenosis.    Assessment:    Patient presents with lower back pain  1. Lumbar radiculitis    2. Other spondylosis, lumbar region    3. Compression fracture of T12 vertebra, initial encounter          Plan:  I have stressed the importance of physical activity and exercise to improve overall health  Reviewed pertinent imaging and records with patient  Continue monitor for progress in T12 kyphoplasty procedure.  I think that the patient's back pain and radicular leg symptoms are due to degenerative disc disease and have recommended a lumbar epidural steroid injection to the Right L4-5 and L5-S1 level(s).  Follow-up after procedure  Thank you for referring this interesting patient, and I look forward to continuing to collaborate in her care.

## 2025-02-14 NOTE — TELEPHONE ENCOUNTER
Level of Service:76304     AL OFFICE/OUTPT VISIT, EST, LEVL IV, 30-39 MIN      Types of orders made on 02/14/2025: Procedure Request      Order Date:2/14/2025   Ordering User:RADHA BAZAN [[id number]]   Encounter Provider:Radha Bazan MD [[id number]]   Authorizing Provider: Radha Bazan MD [[id number]]   Department:[facility] PAIN MANAGEMENT[[id number]]      Common Order Information   Procedure -> Transforaminal Injection (Specify level and laterality) Cmt: Right             L4-5 and L5-S1      Order Specific Information   Order: Procedure Order to Pain Management [Custom: [id number]]  Order #:          [id number]: 1 FUTURE     Priority: Routine  Class: Clinic Performed     Future Order Information       Expires on:02/14/2026            Expected by:02/14/2025                   Associated Diagnoses       M54.16 Lumbar radiculitis       Facility Name: -> [facility]              Priority: Routine  Class: Clinic Performed     Future Order Information       Expires on:02/14/2026            Expected by:02/14/2025                   Associated Diagnoses       M54.16 Lumbar radiculitis       Procedure -> Transforaminal Injection (Specify level and laterality) Cmt:                 Right L4-5 and L5-S1

## (undated) DEVICE — GOWN POLY REINF BRTH SLV XL

## (undated) DEVICE — NDL SAFETY 25G X 1.5 ECLIPSE

## (undated) DEVICE — SPONGE COTTON TRAY 4X4IN

## (undated) DEVICE — SYS LABEL CORRECT MED

## (undated) DEVICE — SYS KYPHON DEL GUN BNE FLR SZ3

## (undated) DEVICE — PACK SIRUS BASIC V SURG STRL

## (undated) DEVICE — APPLICATOR CHLORAPREP ORN 26ML

## (undated) DEVICE — TAMP KYPHON EXP INF BONE 15MM

## (undated) DEVICE — TOWEL OR DISP STRL BLUE 4/PK

## (undated) DEVICE — DRAPE C ARM 42 X 120 10/BX

## (undated) DEVICE — Device

## (undated) DEVICE — BLADE SURG CARBON STEEL SZ11

## (undated) DEVICE — DRAPE INCISE IOBAN 2 23X17IN

## (undated) DEVICE — INTRODUCER KYPHON EX 10GA

## (undated) DEVICE — SYR KYPHON INFLATION

## (undated) DEVICE — GLOVE SENSICARE PI GRN 7.5

## (undated) DEVICE — NDL SPINAL SPINOCAN 22GX3.5

## (undated) DEVICE — CARTRIDGE KYPHON CEMENT DEL

## (undated) DEVICE — CONTAINER SPECIMEN OR STER 4OZ

## (undated) DEVICE — DRAPE MINOR FEN 98X77X121IN

## (undated) DEVICE — ADHESIVE DERMABOND ADVANCED